# Patient Record
Sex: FEMALE | Race: WHITE | NOT HISPANIC OR LATINO | URBAN - METROPOLITAN AREA
[De-identification: names, ages, dates, MRNs, and addresses within clinical notes are randomized per-mention and may not be internally consistent; named-entity substitution may affect disease eponyms.]

---

## 2021-12-09 ENCOUNTER — TELEPHONE (OUTPATIENT)
Dept: OBSTETRICS AND GYNECOLOGY | Age: 31
End: 2021-12-09

## 2021-12-22 ENCOUNTER — INITIAL PRENATAL (OUTPATIENT)
Dept: OBSTETRICS AND GYNECOLOGY | Age: 31
End: 2021-12-22

## 2021-12-22 VITALS
HEIGHT: 65 IN | DIASTOLIC BLOOD PRESSURE: 76 MMHG | WEIGHT: 134.2 LBS | BODY MASS INDEX: 22.36 KG/M2 | SYSTOLIC BLOOD PRESSURE: 134 MMHG

## 2021-12-22 DIAGNOSIS — Z76.89 ENCOUNTER TO ESTABLISH CARE: ICD-10-CM

## 2021-12-22 DIAGNOSIS — Z14.8 CARRIER OF SPINAL MUSCULAR ATROPHY: ICD-10-CM

## 2021-12-22 DIAGNOSIS — Z13.89 SCREENING FOR HEMATURIA OR PROTEINURIA: ICD-10-CM

## 2021-12-22 DIAGNOSIS — O21.9 NAUSEA AND VOMITING DURING PREGNANCY: ICD-10-CM

## 2021-12-22 DIAGNOSIS — Z32.00 ENCOUNTER FOR CONFIRMATION OF PREGNANCY TEST RESULT WITH PHYSICAL EXAMINATION: Primary | ICD-10-CM

## 2021-12-22 DIAGNOSIS — Z3A.09 9 WEEKS GESTATION OF PREGNANCY: ICD-10-CM

## 2021-12-22 DIAGNOSIS — Z15.89 MTHFR MUTATION: ICD-10-CM

## 2021-12-22 LAB
CLARITY, POC: CLEAR
COLOR UR: YELLOW
GLUCOSE UR STRIP-MCNC: NEGATIVE MG/DL
PROT UR STRIP-MCNC: ABNORMAL MG/DL

## 2021-12-22 PROCEDURE — 0501F PRENATAL FLOW SHEET: CPT | Performed by: STUDENT IN AN ORGANIZED HEALTH CARE EDUCATION/TRAINING PROGRAM

## 2021-12-22 RX ORDER — PROMETHAZINE HYDROCHLORIDE 25 MG/1
25 TABLET ORAL EVERY 6 HOURS PRN
Qty: 30 TABLET | Refills: 2 | Status: SHIPPED | OUTPATIENT
Start: 2021-12-22 | End: 2022-06-29 | Stop reason: HOSPADM

## 2021-12-22 RX ORDER — FOLIC ACID 1 MG/1
4 TABLET ORAL DAILY
COMMUNITY
End: 2022-06-29 | Stop reason: HOSPADM

## 2021-12-22 NOTE — PROGRESS NOTES
[facility]   Obstetrics and Gynecology   New Obstetric Visit    2021    Patient: Wayne Mejia          MR#:[id number]    History of Present Illness    Chief Complaint   Patient presents with   • Gynecologic Exam     New OB - Preg conf HPT+ LMP 10/19 US today, c/o nausea   • Establish Care       31 y.o. female  at 9w1d presents for NOB visit.  She is doing well today.  Taking prenatal vitamins.  Still having daily nausea despite b6/unisom.    Had preconception counseling and genetic testing at [facility].  Copy in media tab.  Positive carrier of SMA, MTHFR, and CAH.  FOB has not been tested.  Denies h/o VTE and other family history of genetic disorders.    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 [employer]  Parents live in [place]    Past Medical History:   Diagnosis Date   • Carrier of spinal muscular atrophy     carrier positive, in media tab   • Lumbar herniated disc    • MTHFR gene mutation     carrier positive, in media tab     Past Surgical History:   Procedure Laterality Date   • WISDOM TOOTH EXTRACTION       Social History     Tobacco Use   Smoking Status Never Smoker   Smokeless Tobacco Never Used     Family History   Problem Relation Age of Onset   • No Known Problems Mother    • Hypertension Father    • Breast cancer Neg Hx    • Ovarian cancer Neg Hx    • Uterine cancer Neg Hx    • Colon cancer Neg Hx      Prior to Admission medications    Medication Sig Start Date End  "Date Taking? Authorizing Provider   folic acid (FOLVITE) 1 MG tablet Take 4 mg by mouth Daily.   Yes Provider, MD Armando   doxylamine (UNISOM) 25 MG tablet Take 1-2 tablets before bed as needed for nausea/vomiting 12/15/21   Tessa Purvis MD   promethazine (PHENERGAN) 25 MG tablet Take 1 tablet by mouth Every 6 (Six) Hours As Needed for Nausea or Vomiting for up to 30 doses. 12/22/21   Tessa Purvis MD     ________________________________________    The following portions of the patient's history were reviewed and updated as appropriate: allergies, current medications, past family history, past medical history, past social history, past surgical history and problem list.    Review of Systems   Gastrointestinal: Positive for nausea.   All other systems reviewed and are negative.           Objective     /76   Ht 165.1 cm (65\")   Wt 60.9 kg (134 lb 3.2 oz)   LMP 10/19/2021   BMI 22.33 kg/m²    BP Readings from Last 3 Encounters:   12/22/21 134/76      Wt Readings from Last 3 Encounters:   12/22/21 60.9 kg (134 lb 3.2 oz)        BMI: Estimated body mass index is 22.33 kg/m² as calculated from the following:    Height as of this encounter: 165.1 cm (65\").    Weight as of this encounter: 60.9 kg (134 lb 3.2 oz).    Physical Exam  Vitals and nursing note reviewed. Exam conducted with a chaperone present.   Constitutional:       General: She is not in acute distress.     Appearance: Normal appearance.   HENT:      Head: Normocephalic and atraumatic.   Eyes:      Extraocular Movements: Extraocular movements intact.   Cardiovascular:      Rate and Rhythm: Normal rate and regular rhythm.      Pulses: Normal pulses.      Heart sounds: No murmur heard.      Pulmonary:      Effort: Pulmonary effort is normal. No respiratory distress.      Breath sounds: Normal breath sounds.   Chest:   Breasts:      Right: Normal. No mass, nipple discharge, skin change, tenderness or axillary adenopathy.      Left: " Normal. No mass, nipple discharge, skin change, tenderness or axillary adenopathy.       Abdominal:      General: There is no distension.      Palpations: Abdomen is soft. There is no mass.      Tenderness: There is no abdominal tenderness.   Genitourinary:     General: Normal vulva.      Labia:         Right: No rash or lesion.         Left: No rash or lesion.       Urethra: No prolapse, urethral swelling or urethral lesion.      Vagina: Normal.      Cervix: Normal.      Uterus: Normal. Enlarged (8-9 wk uterus).       Adnexa: Right adnexa normal and left adnexa normal.      Comments: Bladder: no masses or tenderness  Perineum/Anus: no masses, lesions, or skin changes  Musculoskeletal:         General: No swelling. Normal range of motion.      Cervical back: Normal range of motion.   Lymphadenopathy:      Upper Body:      Right upper body: No axillary adenopathy.      Left upper body: No axillary adenopathy.   Skin:     General: Skin is warm and dry.   Neurological:      General: No focal deficit present.      Mental Status: She is alert and oriented to person, place, and time.   Psychiatric:         Mood and Affect: Mood normal.         Behavior: Behavior normal.           Assessment:  Diagnoses and all orders for this visit:    1. Encounter for confirmation of pregnancy test result with physical examination (Primary)  -     POC Urinalysis Dipstick  -     Urine Culture - Urine, Urine, Clean Catch    2. Screening for hematuria or proteinuria  -     POC Urinalysis Dipstick  -     Urine Culture - Urine, Urine, Clean Catch    3. Encounter to establish care    4. 9 weeks gestation of pregnancy  Overview:  -PNL: collect today  -Pap: NIL in 2021 per patient report  -Genetics: screening options reviewed, Bianca pamphlet provided, plan for anatomy Us at 18-22 wga  -Imm: RI (in media tab), jaida titer today, tdap > 27wga, s/p covid vaccine with booster, s/p flu shot  -Contraception: discuss at future visit  -Birthplan: discuss  at future visit      Orders:  -     ABO / Rh  -     Hepatitis B Surface Antigen  -     Cancel: Rubella Antibody, IgG  -     Antibody Screen  -     Varicella Zoster Antibody, IgG  -     CBC (No Diff)  -     HIV-1 / O / 2 Ag / Antibody 4th Generation  -     Hemoglobinopathy Fractionation Cascade  -     RPR, Rfx Qn RPR / Confirm TP  -     HCV Antibody Rfx To Qnt PCR  -     Chlamydia trachomatis, Neisseria gonorrhoeae, Trichomonas vaginalis, PCR - Swab, Vagina    5. MTHFR mutation  Overview:  -identified on preconception genetic testing  -denies h/o VTE  -no inc risk of VTE, preeclampsia, or other pregnancy complications  -taking folic acid 4mg qdaily      6. Nausea and vomiting during pregnancy  Overview:  -minimally improved with unisom/b6  -tolerating po but still having daily nausea  -rx phenergen sent to pharmacy, discussed that zofran is okay after 10wga      7. Carrier of spinal muscular atrophy  Overview:  -pos on preconception carrier screen  -need partner testing, contacted [facility] and they will send testing kit to office for FOB  -will need genetic counseling if FOB positive as well      Other orders  -     promethazine (PHENERGAN) 25 MG tablet; Take 1 tablet by mouth Every 6 (Six) Hours As Needed for Nausea or Vomiting for up to 30 doses.  Dispense: 30 tablet; Refill: 2        Plan:  Return in about 4 weeks (around 1/19/2022).   Nehemiah needs [employer] testing  SAB precautions reviewed    Tessa Purvis MD  12/22/2021 14:07 EST

## 2021-12-23 LAB
ABO GROUP BLD: NORMAL
BLD GP AB SCN SERPL QL: NEGATIVE
ERYTHROCYTE [DISTWIDTH] IN BLOOD BY AUTOMATED COUNT: 12.9 % (ref 11.7–15.4)
HBV SURFACE AG SERPL QL IA: NEGATIVE
HCT VFR BLD AUTO: 37.4 % (ref 34–46.6)
HCV AB S/CO SERPL IA: <0.1 S/CO RATIO (ref 0–0.9)
HCV AB SERPL QL IA: NORMAL
HGB A MFR BLD ELPH: 97.4 % (ref 96.4–98.8)
HGB A2 MFR BLD ELPH: 2.6 % (ref 1.8–3.2)
HGB BLD-MCNC: 12.6 G/DL (ref 11.1–15.9)
HGB F MFR BLD ELPH: 0 % (ref 0–2)
HGB FRACT BLD-IMP: NORMAL
HGB S MFR BLD ELPH: 0 %
HIV 1+2 AB+HIV1 P24 AG SERPL QL IA: NON REACTIVE
MCH RBC QN AUTO: 29.8 PG (ref 26.6–33)
MCHC RBC AUTO-ENTMCNC: 33.7 G/DL (ref 31.5–35.7)
MCV RBC AUTO: 88 FL (ref 79–97)
PLATELET # BLD AUTO: 248 X10E3/UL (ref 150–450)
RBC # BLD AUTO: 4.23 X10E6/UL (ref 3.77–5.28)
RH BLD: POSITIVE
RPR SER QL: NON REACTIVE
VZV IGG SER IA-ACNC: 822 INDEX
WBC # BLD AUTO: 8.2 X10E3/UL (ref 3.4–10.8)

## 2021-12-24 LAB
BACTERIA UR CULT: NO GROWTH
BACTERIA UR CULT: NORMAL
C TRACH RRNA SPEC QL NAA+PROBE: NEGATIVE
N GONORRHOEA RRNA SPEC QL NAA+PROBE: NEGATIVE
T VAGINALIS DNA SPEC QL NAA+PROBE: NEGATIVE

## 2021-12-24 NOTE — PROGRESS NOTES
Please call patient to let her know her OB panel was all normal.    Thanks!  Tessa Purvis MD  12/24/21 12:16 EST

## 2022-01-19 ENCOUNTER — ROUTINE PRENATAL (OUTPATIENT)
Dept: OBSTETRICS AND GYNECOLOGY | Age: 32
End: 2022-01-19

## 2022-01-19 VITALS — BODY MASS INDEX: 22.17 KG/M2 | DIASTOLIC BLOOD PRESSURE: 58 MMHG | WEIGHT: 133.2 LBS | SYSTOLIC BLOOD PRESSURE: 108 MMHG

## 2022-01-19 DIAGNOSIS — O21.9 NAUSEA AND VOMITING DURING PREGNANCY: ICD-10-CM

## 2022-01-19 DIAGNOSIS — Z15.89 MTHFR MUTATION: ICD-10-CM

## 2022-01-19 DIAGNOSIS — Z14.8 CARRIER OF SPINAL MUSCULAR ATROPHY: ICD-10-CM

## 2022-01-19 DIAGNOSIS — Z15.89 CYP1A2 GENE MUTATION: ICD-10-CM

## 2022-01-19 DIAGNOSIS — Z13.89 SCREENING FOR HEMATURIA OR PROTEINURIA: ICD-10-CM

## 2022-01-19 DIAGNOSIS — Z3A.13 13 WEEKS GESTATION OF PREGNANCY: Primary | ICD-10-CM

## 2022-01-19 LAB
CLARITY, POC: CLEAR
COLOR UR: YELLOW
GLUCOSE UR STRIP-MCNC: NEGATIVE MG/DL
PROT UR STRIP-MCNC: NEGATIVE MG/DL

## 2022-01-19 PROCEDURE — 0502F SUBSEQUENT PRENATAL CARE: CPT | Performed by: STUDENT IN AN ORGANIZED HEALTH CARE EDUCATION/TRAINING PROGRAM

## 2022-01-19 RX ORDER — PRENATAL VIT NO.126/IRON/FOLIC 28MG-0.8MG
1 TABLET ORAL DAILY
COMMUNITY
End: 2022-08-18

## 2022-01-19 NOTE — PROGRESS NOTES
Wayne Mejia presents at 13w1d for OB follow-up.  She is doing well today with no complaints.  Denies VB and pain.    Objective  BP Readings from Last 3 Encounters:   22 108/58   21 134/76      Wt Readings from Last 3 Encounters:   22 60.4 kg (133 lb 3.2 oz)   21 60.9 kg (134 lb 3.2 oz)        General: Awake, alert, no apparent distress  Respiratory: No increased work of breathing  Abdomen: Fundus soft and nontender  Extremity: Nontender, no edema    A/P  Diagnoses and all orders for this visit:    1. 13 weeks gestation of pregnancy (Primary)  Overview:  -PNL: normal  -Pap: NIL in  per patient report  -Genetics: plan for quadscreen at next visit, plan for anatomy Us at 18-22 wga  -Imm: RI (in media tab), VI, tdap > 27wga, s/p covid vaccine with booster, s/p flu shot  -Contraception: discuss at future visit  -Birthplan: discuss at future visit        2. Screening for hematuria or proteinuria  -     POC Urinalysis Dipstick    3. CY gene mutation  Overview:  -pos carrier on preconception genetic testing  -FOB testing with [employer] [facility] pending  -will refer for genetic counseling if FOB positive      4. MTHFR mutation  Overview:  -identified on preconception genetic testing  -denies h/o VTE  -no inc risk of VTE, preeclampsia, or other pregnancy complications  -taking folic acid 4mg qdaily      5. Nausea and vomiting during pregnancy  Overview:  -almost resolved, takes phenergen prn, nausea only at night, may start unisom qHS again      6. Carrier of spinal muscular atrophy  Overview:  -pos on preconception carrier screen  -FOB testing with [employer] pending  -will need genetic counseling if FOB positive as well          SAB precautions reviewed  Return in about 4 weeks (around 2022) for Next scheduled follow up.    Tessa Purvis MD

## 2022-02-03 ENCOUNTER — TELEPHONE (OUTPATIENT)
Dept: OBSTETRICS AND GYNECOLOGY | Age: 32
End: 2022-02-03

## 2022-02-03 NOTE — TELEPHONE ENCOUNTER
Pt notified [facility] Carrier Screen for partner Jeremy Mejia is negative.  Understanding verbalized by pt.  
No significant past surgical history

## 2022-02-17 ENCOUNTER — ROUTINE PRENATAL (OUTPATIENT)
Dept: OBSTETRICS AND GYNECOLOGY | Age: 32
End: 2022-02-17

## 2022-02-17 VITALS — WEIGHT: 135.6 LBS | DIASTOLIC BLOOD PRESSURE: 50 MMHG | SYSTOLIC BLOOD PRESSURE: 108 MMHG | BODY MASS INDEX: 22.57 KG/M2

## 2022-02-17 DIAGNOSIS — Z15.89 MTHFR MUTATION: ICD-10-CM

## 2022-02-17 DIAGNOSIS — Z13.89 SCREENING FOR HEMATURIA OR PROTEINURIA: ICD-10-CM

## 2022-02-17 DIAGNOSIS — Z14.8 CARRIER OF SPINAL MUSCULAR ATROPHY: ICD-10-CM

## 2022-02-17 DIAGNOSIS — Z3A.17 17 WEEKS GESTATION OF PREGNANCY: Primary | ICD-10-CM

## 2022-02-17 PROBLEM — O21.9 NAUSEA AND VOMITING DURING PREGNANCY: Status: RESOLVED | Noted: 2021-12-22 | Resolved: 2022-02-17

## 2022-02-17 PROCEDURE — 0502F SUBSEQUENT PRENATAL CARE: CPT | Performed by: STUDENT IN AN ORGANIZED HEALTH CARE EDUCATION/TRAINING PROGRAM

## 2022-02-17 NOTE — PROGRESS NOTES
Wayne Mejia presents at 17w2d for OB follow-up.  She is doing well today with no complaints.  Denies VB and pain.  Has felt a few flutters but no consistent mvmt.    Objective  BP Readings from Last 3 Encounters:   02/17/22 108/50   01/19/22 108/58   12/22/21 134/76      Wt Readings from Last 3 Encounters:   02/17/22 61.5 kg (135 lb 9.6 oz)   01/19/22 60.4 kg (133 lb 3.2 oz)   12/22/21 60.9 kg (134 lb 3.2 oz)      Total weight gain this pregnancy: 0.272 kg (9.6 oz)    General: Awake, alert, no apparent distress  Respiratory: No increased work of breathing  Abdomen: Fundus soft and nontender  Extremity: Nontender, no edema    A/P  Diagnoses and all orders for this visit:    1. 17 weeks gestation of pregnancy (Primary)  Overview:  -PNL: normal  -Pap: NIL in 2021 per patient report  -Genetics: quadscreen today, anatomy Us at next visit  -Imm: RI (in media tab), VI, tdap > 27wga, s/p covid vaccine with booster, s/p flu shot  -Contraception: discuss at future visit  -Birthplan: discuss at future visit      Orders:  -     Maternal Screen 4    2. Screening for hematuria or proteinuria  -     POC Urinalysis Dipstick    3. MTHFR mutation  Overview:  -identified on preconception genetic testing  -denies h/o VTE  -no inc risk of VTE, preeclampsia, or other pregnancy complications  -taking folic acid 4mg qdaily      4. Carrier of spinal muscular atrophy  Overview:  -pos on preconception carrier screen  -FOB testing neg        SAB precautions reviewed  Return in about 4 weeks (around 3/17/2022) for Next f/u with anatomy Us.    Tessa Purvis MD

## 2022-02-19 LAB
2ND TRIMESTER 4 SCREEN SERPL-IMP: NORMAL
AFP ADJ MOM SERPL: 0.64
AFP SERPL-MCNC: 27.5 NG/ML
AGE AT DELIVERY: 29.8 YR
FET TS 18 RISK FROM MAT AGE: NORMAL
FET TS 21 RISK FROM MAT AGE: 716
GA METHOD: NORMAL
GA: 17.3 WEEKS
HCG ADJ MOM SERPL: 0.98
HCG SERPL-ACNC: NORMAL MIU/ML
IDDM PATIENT QL: NO
INHIBIN A ADJ MOM SERPL: 0.92
INHIBIN A SERPL-MCNC: 154.33 PG/ML
MULTIPLE PREGNANCY: NO
NEURAL TUBE DEFECT RISK FETUS: NORMAL %
SERVICE CMNT-IMP: NORMAL
TS 18 RISK FETUS: NORMAL
TS 21 RISK FETUS: 2317
U ESTRIOL ADJ MOM SERPL: 0.88
U ESTRIOL SERPL-MCNC: 1.15 NG/ML

## 2022-02-21 NOTE — PROGRESS NOTES
Please call patient to let her know that her screen for neural tube defects was negative.Thank you,  Tessa Purvis MD  02/21/22  07:59 EST

## 2022-03-22 ENCOUNTER — ROUTINE PRENATAL (OUTPATIENT)
Dept: OBSTETRICS AND GYNECOLOGY | Age: 32
End: 2022-03-22

## 2022-03-22 VITALS — SYSTOLIC BLOOD PRESSURE: 112 MMHG | DIASTOLIC BLOOD PRESSURE: 68 MMHG | WEIGHT: 137.4 LBS | BODY MASS INDEX: 22.86 KG/M2

## 2022-03-22 DIAGNOSIS — Z15.89 CYP1A2 GENE MUTATION: ICD-10-CM

## 2022-03-22 DIAGNOSIS — Z13.89 SCREENING FOR HEMATURIA OR PROTEINURIA: ICD-10-CM

## 2022-03-22 DIAGNOSIS — Z3A.22 22 WEEKS GESTATION OF PREGNANCY: Primary | ICD-10-CM

## 2022-03-22 DIAGNOSIS — Z14.8 CARRIER OF SPINAL MUSCULAR ATROPHY: ICD-10-CM

## 2022-03-22 DIAGNOSIS — Z15.89 MTHFR MUTATION: ICD-10-CM

## 2022-03-22 PROCEDURE — 0502F SUBSEQUENT PRENATAL CARE: CPT | Performed by: STUDENT IN AN ORGANIZED HEALTH CARE EDUCATION/TRAINING PROGRAM

## 2022-03-22 NOTE — PROGRESS NOTES
Wayne Mejia, a 31 y.o.  at 22w0d, presents for OB follow-up.  She is doing well today with no complaints.  Denies loss of fluid, vaginal bleeding, and contractions.  Endorses fetal movements.  Anatomy Us today normal.    Objective  BP Readings from Last 3 Encounters:   22 112/68   22 108/50   22 108/58      Wt Readings from Last 3 Encounters:   22 62.3 kg (137 lb 6.4 oz)   22 61.5 kg (135 lb 9.6 oz)   22 60.4 kg (133 lb 3.2 oz)      Total weight gain this pregnancy: 1.089 kg (2 lb 6.4 oz)    General: Awake, alert, no apparent distress  Respiratory: No increased work of breathing  Abdomen: Fundus soft and nontender  Extremity: Nontender, no edema    A/P  Diagnoses and all orders for this visit:    1. 22 weeks gestation of pregnancy (Primary)  Overview:  -PNL: normal  -Pap: NIL in  per patient report  -Genetics: quadscreen normal, anatomy Us normal  -Imm: RI (in media tab), VI, tdap > 27wga, s/p covid vaccine with booster, s/p flu shot  -Contraception: discuss at future visit  -Birthplan: discuss at future visit        2. Screening for hematuria or proteinuria  -     POC Urinalysis Dipstick    3. CY gene mutation  Overview:  -pos carrier on preconception genetic testing      4. MTHFR mutation  Overview:  -identified on preconception genetic testing  -denies h/o VTE  -no inc risk of VTE, preeclampsia, or other pregnancy complications  -taking folic acid 4mg qdaily      5. Carrier of spinal muscular atrophy  Overview:  -pos on preconception carrier screen  -FOB testing neg          Labor precautions reviewed  Return in about 4 weeks (around 2022) for F/u with 1hr GTT/CBC.    Tessa Purvis MD

## 2022-03-28 ENCOUNTER — PATIENT MESSAGE (OUTPATIENT)
Dept: OBSTETRICS AND GYNECOLOGY | Age: 32
End: 2022-03-28

## 2022-03-28 NOTE — TELEPHONE ENCOUNTER
From: Wayne Mejia  To: Tessa Purvis MD  Sent: 3/28/2022 11:28 AM EDT  Subject: Random question    Hi! This is random but I thought it couldn’t hurt to ask…I took my dog for a well visit and turns out he has giardia which we’re going to treat. I primarily clean up after him and obviously wash my hands and such, but is there anything I should be worried about if I’m symptom free?

## 2022-04-20 ENCOUNTER — ROUTINE PRENATAL (OUTPATIENT)
Dept: OBSTETRICS AND GYNECOLOGY | Age: 32
End: 2022-04-20

## 2022-04-20 VITALS — SYSTOLIC BLOOD PRESSURE: 104 MMHG | DIASTOLIC BLOOD PRESSURE: 54 MMHG | WEIGHT: 142 LBS | BODY MASS INDEX: 23.63 KG/M2

## 2022-04-20 DIAGNOSIS — Z15.89 MTHFR MUTATION: ICD-10-CM

## 2022-04-20 DIAGNOSIS — Z14.8 CARRIER OF SPINAL MUSCULAR ATROPHY: ICD-10-CM

## 2022-04-20 DIAGNOSIS — Z15.89 CYP1A2 GENE MUTATION: ICD-10-CM

## 2022-04-20 DIAGNOSIS — Z3A.26 26 WEEKS GESTATION OF PREGNANCY: Primary | ICD-10-CM

## 2022-04-20 DIAGNOSIS — Z13.89 SCREENING FOR HEMATURIA OR PROTEINURIA: ICD-10-CM

## 2022-04-20 DIAGNOSIS — Z13.0 SCREENING FOR IRON DEFICIENCY ANEMIA: ICD-10-CM

## 2022-04-20 DIAGNOSIS — Z13.1 SCREENING FOR DIABETES MELLITUS (DM): ICD-10-CM

## 2022-04-20 PROCEDURE — 0502F SUBSEQUENT PRENATAL CARE: CPT | Performed by: STUDENT IN AN ORGANIZED HEALTH CARE EDUCATION/TRAINING PROGRAM

## 2022-04-20 NOTE — PROGRESS NOTES
Wayne Mejia, a 31 y.o.  at 26w1d, presents for OB follow-up.  She is doing well today.  Denies loss of fluid, vaginal bleeding, and contractions.  Endorses fetal movements.    Planning flight in 2 wks.  Planning road trip to [place] around .    Objective  BP Readings from Last 3 Encounters:   22 104/54   22 112/68   22 108/50      Wt Readings from Last 3 Encounters:   22 64.4 kg (142 lb)   22 62.3 kg (137 lb 6.4 oz)   22 61.5 kg (135 lb 9.6 oz)      Total weight gain this pregnancy: 3.175 kg (7 lb)    General: Awake, alert, no apparent distress  Respiratory: No increased work of breathing  Abdomen: Fundus soft and nontender  Extremity: Nontender, no edema    A/P  Diagnoses and all orders for this visit:    1. 26 weeks gestation of pregnancy (Primary)  Overview:  -PNL: normal, 1hr and CBC today  -Pap: NIL in  per patient report  -Genetics: quadscreen normal, anatomy Us normal  -Imm: RI (in media tab), VI, tdap > 27wga, s/p covid vaccine with booster, s/p flu shot  -Contraception: discuss at future visit  -Birthplan: discuss at future visit        2. Screening for hematuria or proteinuria  -     POC Urinalysis Dipstick    3. CY gene mutation  Overview:  -pos carrier on preconception genetic testing      4. MTHFR mutation  Overview:  -identified on preconception genetic testing  -denies h/o VTE  -no inc risk of VTE, preeclampsia, or other pregnancy complications  -taking folic acid 4mg qdaily      5. Carrier of spinal muscular atrophy  Overview:  -pos on preconception carrier screen  -FOB testing neg      6. Screening for diabetes mellitus (DM)  -     Gestational Screen 1 Hr ([facility])    7. Screening for iron deficiency anemia  -     CBC (No Diff)        Labor precautions reviewed  Return in about 4 weeks (around 2022) for Next f/u.    Tessa Shannon Purvis MD

## 2022-04-21 LAB
ERYTHROCYTE [DISTWIDTH] IN BLOOD BY AUTOMATED COUNT: 12.1 % (ref 11.7–15.4)
GLUCOSE 1H P 50 G GLC PO SERPL-MCNC: 81 MG/DL (ref 65–139)
HCT VFR BLD AUTO: 32.7 % (ref 34–46.6)
HGB BLD-MCNC: 11.1 G/DL (ref 11.1–15.9)
MCH RBC QN AUTO: 30.4 PG (ref 26.6–33)
MCHC RBC AUTO-ENTMCNC: 33.9 G/DL (ref 31.5–35.7)
MCV RBC AUTO: 90 FL (ref 79–97)
PLATELET # BLD AUTO: 223 X10E3/UL (ref 150–450)
RBC # BLD AUTO: 3.65 X10E6/UL (ref 3.77–5.28)
WBC # BLD AUTO: 9.5 X10E3/UL (ref 3.4–10.8)

## 2022-04-21 NOTE — PROGRESS NOTES
Please call patient to let her know her gestational diabetes screen was normal.  Blood counts look great too.

## 2022-05-18 ENCOUNTER — ROUTINE PRENATAL (OUTPATIENT)
Dept: OBSTETRICS AND GYNECOLOGY | Age: 32
End: 2022-05-18

## 2022-05-18 VITALS — DIASTOLIC BLOOD PRESSURE: 58 MMHG | SYSTOLIC BLOOD PRESSURE: 112 MMHG | WEIGHT: 147.6 LBS | BODY MASS INDEX: 24.56 KG/M2

## 2022-05-18 DIAGNOSIS — Z15.89 MTHFR MUTATION: ICD-10-CM

## 2022-05-18 DIAGNOSIS — Z13.89 SCREENING FOR HEMATURIA OR PROTEINURIA: ICD-10-CM

## 2022-05-18 DIAGNOSIS — Z14.8 CARRIER OF SPINAL MUSCULAR ATROPHY: ICD-10-CM

## 2022-05-18 DIAGNOSIS — Z15.89 CYP1A2 GENE MUTATION: ICD-10-CM

## 2022-05-18 DIAGNOSIS — Z3A.30 30 WEEKS GESTATION OF PREGNANCY: Primary | ICD-10-CM

## 2022-05-18 DIAGNOSIS — O26.849 UTERINE SIZE DATE DISCREPANCY PREGNANCY: ICD-10-CM

## 2022-05-18 PROCEDURE — 0502F SUBSEQUENT PRENATAL CARE: CPT | Performed by: STUDENT IN AN ORGANIZED HEALTH CARE EDUCATION/TRAINING PROGRAM

## 2022-05-18 PROCEDURE — 90471 IMMUNIZATION ADMIN: CPT | Performed by: STUDENT IN AN ORGANIZED HEALTH CARE EDUCATION/TRAINING PROGRAM

## 2022-05-18 PROCEDURE — 90715 TDAP VACCINE 7 YRS/> IM: CPT | Performed by: STUDENT IN AN ORGANIZED HEALTH CARE EDUCATION/TRAINING PROGRAM

## 2022-05-18 NOTE — PROGRESS NOTES
Wayne Mejia, a 31 y.o.  at 30w1d, presents for OB follow-up.  She is doing well today.  Denies loss of fluid, vaginal bleeding, and contractions.  Endorses fetal movements.    Objective  BP Readings from Last 3 Encounters:   22 112/58   22 104/54   22 112/68      Wt Readings from Last 3 Encounters:   22 67 kg (147 lb 9.6 oz)   22 64.4 kg (142 lb)   22 62.3 kg (137 lb 6.4 oz)      Total weight gain this pregnancy: 5.715 kg (12 lb 9.6 oz)    General: Awake, alert, no apparent distress  Respiratory: No increased work of breathing  Abdomen: Fundus soft and nontender  Extremity: Nontender, no edema    A/P  Diagnoses and all orders for this visit:    1. 30 weeks gestation of pregnancy (Primary)  Overview:  -PNL: normal, 1hr nromal, plan for GBS/CBC at 36 wga  -Pap: NIL in  per patient report  -Genetics: quadscreen normal, anatomy Us normal  -Imm: RI (in media tab), VI, tdap today, s/p covid vaccine with booster, s/p flu shot  -Contraception: thinking OCPs (junel 1/20 in past)  -Birthplan: girl, likely epidural, breast, taking birth class this Saturday so will bring questions to next visit      2. Screening for hematuria or proteinuria  -     POC Urinalysis Dipstick    3. CY gene mutation  Overview:  -pos carrier on preconception genetic testing      4. MTHFR mutation  Overview:  -identified on preconception genetic testing  -denies h/o VTE  -no inc risk of VTE, preeclampsia, or other pregnancy complications  -taking folic acid 4mg qdaily      5. Carrier of spinal muscular atrophy  Overview:  -pos on preconception carrier screen  -FOB testing neg      6. Uterine size date discrepancy pregnancy  Overview:  -S<D  -growth Us at next visit      Other orders  -     Tdap Vaccine Greater Than or Equal To 8yo IM        Labor precautions reviewed  Return in about 2 weeks (around 2022) for F/u q2 wks x 3 visits (first appt , schedule being opened), growth Us with next  visit.    Tessa Purvis MD

## 2022-05-31 ENCOUNTER — ROUTINE PRENATAL (OUTPATIENT)
Dept: OBSTETRICS AND GYNECOLOGY | Age: 32
End: 2022-05-31

## 2022-05-31 VITALS — BODY MASS INDEX: 24.3 KG/M2 | DIASTOLIC BLOOD PRESSURE: 70 MMHG | WEIGHT: 146 LBS | SYSTOLIC BLOOD PRESSURE: 110 MMHG

## 2022-05-31 DIAGNOSIS — O26.849 UTERINE SIZE DATE DISCREPANCY PREGNANCY: ICD-10-CM

## 2022-05-31 DIAGNOSIS — Z15.89 MTHFR MUTATION: ICD-10-CM

## 2022-05-31 DIAGNOSIS — Z14.8 CARRIER OF SPINAL MUSCULAR ATROPHY: ICD-10-CM

## 2022-05-31 DIAGNOSIS — Z3A.31 31 WEEKS GESTATION OF PREGNANCY: Primary | ICD-10-CM

## 2022-05-31 DIAGNOSIS — Z3A.32 32 WEEKS GESTATION OF PREGNANCY: ICD-10-CM

## 2022-05-31 DIAGNOSIS — Z15.89 CYP1A2 GENE MUTATION: ICD-10-CM

## 2022-05-31 PROCEDURE — 0502F SUBSEQUENT PRENATAL CARE: CPT | Performed by: STUDENT IN AN ORGANIZED HEALTH CARE EDUCATION/TRAINING PROGRAM

## 2022-05-31 NOTE — PROGRESS NOTES
Wayne Mejia, a 31 y.o.  at 32w0d, presents for OB follow-up.  She is doing well today.  Denies loss of fluid, vaginal bleeding, and contractions.  Endorses fetal movements.  Feeling more tired at end of day.    Objective  BP Readings from Last 3 Encounters:   22 110/70   22 112/58   22 104/54      Wt Readings from Last 3 Encounters:   22 66.2 kg (146 lb)   22 67 kg (147 lb 9.6 oz)   22 64.4 kg (142 lb)      Total weight gain this pregnancy: 4.99 kg (11 lb)    General: Awake, alert, no apparent distress  Respiratory: No increased work of breathing  Abdomen: Fundus soft and nontender  Extremity: Nontender, no edema    A/P  Diagnoses and all orders for this visit:    1. 31 weeks gestation of pregnancy (Primary)  -     POC Urinalysis Dipstick    2. MTHFR mutation  Overview:  -identified on preconception genetic testing  -denies h/o VTE  -no inc risk of VTE, preeclampsia, or other pregnancy complications  -taking folic acid 4mg qdaily      3. 32 weeks gestation of pregnancy  Overview:  -PNL: normal, 1hr nromal, plan for GBS/CBC at 36 wga  -Pap: NIL in  per patient report  -Genetics: quadscreen normal, anatomy Us normal  -Imm: RI (in media tab), VI, s/p tdap, s/p covid vaccine with booster, s/p flu shot  -Contraception: thinking OCPs (junel 1/20 in past)  -Birthplan: girl, likely epidural, breast, s/p birth class (overall helpful)  -Care coordination: accepts blood products, call schedule reviewed      4. Uterine size date discrepancy pregnancy  Overview:  -S<D  -growth Us at next visit      5. Carrier of spinal muscular atrophy  Overview:  -pos on preconception carrier screen  -FOB testing neg      6. CY gene mutation  Overview:  -pos carrier on preconception genetic testing        Labor precautions reviewed  RTC 2 wks    Tessa Purvis MD

## 2022-06-07 ENCOUNTER — LAB (OUTPATIENT)
Dept: OBSTETRICS AND GYNECOLOGY | Age: 32
End: 2022-06-07

## 2022-06-07 DIAGNOSIS — O99.713 PRURIGO OF PREGNANCY IN THIRD TRIMESTER: Primary | ICD-10-CM

## 2022-06-07 DIAGNOSIS — L28.2 PRURIGO OF PREGNANCY IN THIRD TRIMESTER: Primary | ICD-10-CM

## 2022-06-07 DIAGNOSIS — L28.2 PRURIGO OF PREGNANCY IN THIRD TRIMESTER: ICD-10-CM

## 2022-06-07 DIAGNOSIS — O99.713 PRURIGO OF PREGNANCY IN THIRD TRIMESTER: ICD-10-CM

## 2022-06-08 ENCOUNTER — DOCUMENTATION (OUTPATIENT)
Dept: OBSTETRICS AND GYNECOLOGY | Age: 32
End: 2022-06-08

## 2022-06-08 LAB
ALP SERPL-CCNC: 207 IU/L (ref 44–121)
ALT SERPL-CCNC: 145 IU/L (ref 0–32)
AST SERPL-CCNC: 80 IU/L (ref 0–40)
BASOPHILS # BLD AUTO: 0.1 X10E3/UL (ref 0–0.2)
BASOPHILS NFR BLD AUTO: 1 %
BILE AC SERPL-SCNC: 36.7 UMOL/L (ref 0–10)
BILIRUB SERPL-MCNC: 0.7 MG/DL (ref 0–1.2)
CREAT SERPL-MCNC: 0.58 MG/DL (ref 0.57–1)
EGFRCR SERPLBLD CKD-EPI 2021: 126 ML/MIN/1.73
EOSINOPHIL # BLD AUTO: 0 X10E3/UL (ref 0–0.4)
EOSINOPHIL NFR BLD AUTO: 1 %
ERYTHROCYTE [DISTWIDTH] IN BLOOD BY AUTOMATED COUNT: 12.6 % (ref 11.7–15.4)
HCT VFR BLD AUTO: 36.3 % (ref 34–46.6)
HGB BLD-MCNC: 12.1 G/DL (ref 11.1–15.9)
IMM GRANULOCYTES # BLD AUTO: 0.2 X10E3/UL (ref 0–0.1)
IMM GRANULOCYTES NFR BLD AUTO: 2 %
LDH SERPL-CCNC: 143 IU/L (ref 119–226)
LYMPHOCYTES # BLD AUTO: 1.8 X10E3/UL (ref 0.7–3.1)
LYMPHOCYTES NFR BLD AUTO: 23 %
MCH RBC QN AUTO: 28.9 PG (ref 26.6–33)
MCHC RBC AUTO-ENTMCNC: 33.3 G/DL (ref 31.5–35.7)
MCV RBC AUTO: 87 FL (ref 79–97)
MONOCYTES # BLD AUTO: 0.7 X10E3/UL (ref 0.1–0.9)
MONOCYTES NFR BLD AUTO: 9 %
NEUTROPHILS # BLD AUTO: 5 X10E3/UL (ref 1.4–7)
NEUTROPHILS NFR BLD AUTO: 64 %
PLATELET # BLD AUTO: 251 X10E3/UL (ref 150–450)
RBC # BLD AUTO: 4.19 X10E6/UL (ref 3.77–5.28)
URATE SERPL-MCNC: 3.8 MG/DL (ref 2.6–6.2)
WBC # BLD AUTO: 7.7 X10E3/UL (ref 3.4–10.8)

## 2022-06-08 NOTE — PROGRESS NOTES
I spoke with patient regarding itching of palms and soles.  She reports that it became noticeable over the weekend in [place] but allergies were also high.  It has since improved and is barely noticeable.  She has not taken an anti-histamine yet.  Itching isn't bothersome enough for tx at this point.   Bile acids and CMP are pending.  Please let me know when these result.    We discussed the possibility of cholestasis and that this does come with increased risk of IUFD as well as early delivery.  However, I have low suspicion for this diagnosis based on the near resolution of symptoms since she has returned to [place].  Will hold off on ursodiol for now.  Discussed anti-histamines prn.

## 2022-06-10 ENCOUNTER — TELEPHONE (OUTPATIENT)
Dept: OBSTETRICS AND GYNECOLOGY | Age: 32
End: 2022-06-10

## 2022-06-10 RX ORDER — URSODIOL 300 MG/1
300 CAPSULE ORAL 3 TIMES DAILY
Qty: 90 CAPSULE | Refills: 1 | Status: SHIPPED | OUTPATIENT
Start: 2022-06-10 | End: 2022-06-12

## 2022-06-10 NOTE — TELEPHONE ENCOUNTER
I spoke with patient regarding her recent labs.  Elevated bile acids and LFTs so discussed diagnosis of intrahepatic cholestasis of pregnancy.  We discussed increased risk of IUFD despite  surveillance.  We will follow [facility] recommendations for management.  Plan for twice weekly BPP and delivery in 36th wk.  Discussed daily FKC.    -Plan for BPP Mon/Thurs until delivery, starting Mon,  - will have office call patient to schedule  -Plan for BMZ administration  and  in office  -Plan for IOL 7/3 at 36w5d  -Rx ursodiol 300mg TID sent for symptomatic relief.

## 2022-06-12 RX ORDER — URSODIOL 250 MG/1
250 TABLET, FILM COATED ORAL 3 TIMES DAILY
Qty: 30 TABLET | Refills: 1 | Status: SHIPPED | OUTPATIENT
Start: 2022-06-12 | End: 2022-06-29 | Stop reason: HOSPADM

## 2022-06-13 ENCOUNTER — ROUTINE PRENATAL (OUTPATIENT)
Dept: OBSTETRICS AND GYNECOLOGY | Age: 32
End: 2022-06-13

## 2022-06-13 VITALS — SYSTOLIC BLOOD PRESSURE: 116 MMHG | BODY MASS INDEX: 24.13 KG/M2 | DIASTOLIC BLOOD PRESSURE: 70 MMHG | WEIGHT: 145 LBS

## 2022-06-13 DIAGNOSIS — O26.849 UTERINE SIZE DATE DISCREPANCY PREGNANCY: ICD-10-CM

## 2022-06-13 DIAGNOSIS — Z15.89 CYP1A2 GENE MUTATION: ICD-10-CM

## 2022-06-13 DIAGNOSIS — O26.613 CHOLESTASIS OF PREGNANCY IN THIRD TRIMESTER: ICD-10-CM

## 2022-06-13 DIAGNOSIS — K83.1 CHOLESTASIS OF PREGNANCY IN THIRD TRIMESTER: ICD-10-CM

## 2022-06-13 DIAGNOSIS — Z14.8 CARRIER OF SPINAL MUSCULAR ATROPHY: ICD-10-CM

## 2022-06-13 DIAGNOSIS — Z15.89 MTHFR MUTATION: ICD-10-CM

## 2022-06-13 DIAGNOSIS — Z13.89 SCREENING FOR BLOOD OR PROTEIN IN URINE: Primary | ICD-10-CM

## 2022-06-13 PROBLEM — O26.643 CHOLESTASIS OF PREGNANCY IN THIRD TRIMESTER: Status: ACTIVE | Noted: 2022-06-13

## 2022-06-13 LAB
GLUCOSE UR STRIP-MCNC: NEGATIVE MG/DL
PROT UR STRIP-MCNC: NEGATIVE MG/DL

## 2022-06-13 PROCEDURE — 0502F SUBSEQUENT PRENATAL CARE: CPT | Performed by: NURSE PRACTITIONER

## 2022-06-13 NOTE — PROGRESS NOTES
Chief Complaint   Patient presents with   • Routine Prenatal Visit       HPI: 31 y.o.  at 33w6d here for BPP.    Newly diagnosed cholestasis. Had itching on hands and feet last week.  Bile acids were elevated at 36.7.  She just started on actigall last night and has taken 3 doses.  States itching has not worsened and may be slightly better.  She denies any contractions or cramping.  Baby is active and always passes FMCs at home.   is present and they have quite a few questions.  Reviewed plan of twice weekly BPPs in office. BMZ planned and delivery at 36.5.  He requests an NST today.        Vitals:    22 1415   BP: 116/70   Weight: 65.8 kg (145 lb)     Total weight gain for pregnancy:  4.536 kg (10 lb)    Review of systems:   Gen: negative  CV:     negative  GI: negative  :   good fetal movement noted   MS:    negative  Neuro: negative and denies headaches and visual changes   Pul: negative    A/P  1. Intrauterine pregnancy at 33w6d   2. Pregnancy Risk:  HIGH RISK        Diagnoses and all orders for this visit:    1. Screening for blood or protein in urine (Primary)  -     POC Urinalysis Dipstick    2. CY gene mutation    3. MTHFR mutation    4. Cholestasis of pregnancy in third trimester    5. Carrier of spinal muscular atrophy    6. Uterine size date discrepancy pregnancy    NST done today x 40 mins.   FHT 140s with moderate variability and accels, no decels.  Occasional contractions noted, patient does not feel them.       labor was discussed.  Warnings were provided.  Routine labor warnings were discussed and indications for L & D f/u including bleeding, regular contractions, decreased fetal movement or/and rupture of membranes.   Discussed FMCs.  She will call with any increase in itching or concerns with FM.  -----------------------  PLAN:   Thursday with Dr Purvis for BPP      Sonja Bell, APRN  2022 15:50 EDT

## 2022-06-16 ENCOUNTER — ROUTINE PRENATAL (OUTPATIENT)
Dept: OBSTETRICS AND GYNECOLOGY | Age: 32
End: 2022-06-16

## 2022-06-16 VITALS — WEIGHT: 146.4 LBS | DIASTOLIC BLOOD PRESSURE: 60 MMHG | SYSTOLIC BLOOD PRESSURE: 116 MMHG | BODY MASS INDEX: 24.36 KG/M2

## 2022-06-16 DIAGNOSIS — O26.613 CHOLESTASIS OF PREGNANCY IN THIRD TRIMESTER: ICD-10-CM

## 2022-06-16 DIAGNOSIS — O26.849 UTERINE SIZE DATE DISCREPANCY PREGNANCY: ICD-10-CM

## 2022-06-16 DIAGNOSIS — K83.1 CHOLESTASIS OF PREGNANCY IN THIRD TRIMESTER: ICD-10-CM

## 2022-06-16 DIAGNOSIS — Z13.89 SCREENING FOR BLOOD OR PROTEIN IN URINE: ICD-10-CM

## 2022-06-16 DIAGNOSIS — K83.1 CHOLESTASIS DURING PREGNANCY IN THIRD TRIMESTER: ICD-10-CM

## 2022-06-16 DIAGNOSIS — O26.613 CHOLESTASIS DURING PREGNANCY IN THIRD TRIMESTER: ICD-10-CM

## 2022-06-16 DIAGNOSIS — Z15.89 CYP1A2 GENE MUTATION: ICD-10-CM

## 2022-06-16 DIAGNOSIS — Z3A.34 34 WEEKS GESTATION OF PREGNANCY: Primary | ICD-10-CM

## 2022-06-16 DIAGNOSIS — Z15.89 MTHFR MUTATION: ICD-10-CM

## 2022-06-16 DIAGNOSIS — Z14.8 CARRIER OF SPINAL MUSCULAR ATROPHY: ICD-10-CM

## 2022-06-16 PROCEDURE — 0502F SUBSEQUENT PRENATAL CARE: CPT | Performed by: STUDENT IN AN ORGANIZED HEALTH CARE EDUCATION/TRAINING PROGRAM

## 2022-06-16 NOTE — PROGRESS NOTES
Wayne Mejia, a 31 y.o.  at 34w2d, presents for OB follow-up.  She is doing well today.  Denies loss of fluid, vaginal bleeding, and contractions.  Endorses fetal movements.  Nehemiah is with her today to discuss cholestasis.    Objective  BP Readings from Last 3 Encounters:   22 116/60   22 116/70   22 110/70      Wt Readings from Last 3 Encounters:   22 66.4 kg (146 lb 6.4 oz)   22 65.8 kg (145 lb)   22 66.2 kg (146 lb)      Total weight gain this pregnancy: 5.171 kg (11 lb 6.4 oz)    General: Awake, alert, no apparent distress  Respiratory: No increased work of breathing  Abdomen: Fundus soft and nontender  Extremity: Nontender, no edema    A/P  Diagnoses and all orders for this visit:    1. 34 weeks gestation of pregnancy (Primary)  Overview:  -PNL: 1hr normal, GBS/CBC today in anticipation of early delivery  -Pap: NIL in  per patient report  -Genetics: quadscreen normal, anatomy Us normal  -Imm: RI (in media tab), VI, s/p tdap, s/p covid vaccine with booster, s/p flu shot  -Contraception: thinking OCPs (junel 1/20 in past)  -Birthplan: girl, likely epidural, breast, s/p birth class (overall helpful)  -Care coordination: accepts blood products, call schedule reviewed    Orders:  -     Bile Acids, Total  -     Comprehensive Metabolic Panel  -     CBC (No Diff)  -     Group B Streptococcus Culture - Swab, Vaginal/Rectum    2. Screening for blood or protein in urine  -     POC Urinalysis Dipstick    3. Cholestasis during pregnancy in third trimester  -     Bile Acids, Total  -     Comprehensive Metabolic Panel    4. CY gene mutation  Overview:  -pos carrier on preconception genetic testing      5. MTHFR mutation  Overview:  -identified on preconception genetic testing  -denies h/o VTE  -no inc risk of VTE, preeclampsia, or other pregnancy complications  -taking folic acid 4mg qdaily      6. Cholestasis of pregnancy in third trimester  Overview:  -total bile acids 37,  AST/ALT 80/145 > repeat labs today and weekly  -continue twice weekly BPP, thus far reassuring  -plan for first dose of BMZ at next visit and second dose 24 hours later      7. Uterine size date discrepancy pregnancy  Overview:  -S<D  -EFW 29% at 32wga, no need for further growth Us      8. Carrier of spinal muscular atrophy  Overview:  -pos on preconception carrier screen  -FOB testing neg        Labor precautions reviewed  Return in about 5 days (around 6/21/2022) for Next f/u with BPP.    Tessa Purvis MD

## 2022-06-17 LAB
ALBUMIN SERPL-MCNC: 3.7 G/DL (ref 3.9–5)
ALBUMIN/GLOB SERPL: 1.4 {RATIO} (ref 1.2–2.2)
ALP SERPL-CCNC: 226 IU/L (ref 44–121)
ALT SERPL-CCNC: 309 IU/L (ref 0–32)
AST SERPL-CCNC: 112 IU/L (ref 0–40)
BILE AC SERPL-SCNC: 134.8 UMOL/L (ref 0–10)
BILIRUB SERPL-MCNC: 0.7 MG/DL (ref 0–1.2)
BUN SERPL-MCNC: 8 MG/DL (ref 6–20)
BUN/CREAT SERPL: 13 (ref 9–23)
CALCIUM SERPL-MCNC: 9 MG/DL (ref 8.7–10.2)
CHLORIDE SERPL-SCNC: 99 MMOL/L (ref 96–106)
CO2 SERPL-SCNC: 22 MMOL/L (ref 20–29)
CREAT SERPL-MCNC: 0.63 MG/DL (ref 0.57–1)
EGFRCR SERPLBLD CKD-EPI 2021: 123 ML/MIN/1.73
ERYTHROCYTE [DISTWIDTH] IN BLOOD BY AUTOMATED COUNT: 12.6 % (ref 11.7–15.4)
GLOBULIN SER CALC-MCNC: 2.7 G/DL (ref 1.5–4.5)
GLUCOSE SERPL-MCNC: 75 MG/DL (ref 65–99)
HCT VFR BLD AUTO: 34.7 % (ref 34–46.6)
HGB BLD-MCNC: 11.6 G/DL (ref 11.1–15.9)
MCH RBC QN AUTO: 28.9 PG (ref 26.6–33)
MCHC RBC AUTO-ENTMCNC: 33.4 G/DL (ref 31.5–35.7)
MCV RBC AUTO: 87 FL (ref 79–97)
PLATELET # BLD AUTO: 229 X10E3/UL (ref 150–450)
POTASSIUM SERPL-SCNC: 4.4 MMOL/L (ref 3.5–5.2)
PROT SERPL-MCNC: 6.4 G/DL (ref 6–8.5)
RBC # BLD AUTO: 4.01 X10E6/UL (ref 3.77–5.28)
SODIUM SERPL-SCNC: 134 MMOL/L (ref 134–144)
WBC # BLD AUTO: 6.8 X10E3/UL (ref 3.4–10.8)

## 2022-06-20 ENCOUNTER — TELEPHONE (OUTPATIENT)
Dept: OBSTETRICS AND GYNECOLOGY | Age: 32
End: 2022-06-20

## 2022-06-20 ENCOUNTER — CLINICAL SUPPORT (OUTPATIENT)
Dept: OBSTETRICS AND GYNECOLOGY | Age: 32
End: 2022-06-20

## 2022-06-20 DIAGNOSIS — K83.1 CHOLESTASIS OF PREGNANCY IN THIRD TRIMESTER: Primary | ICD-10-CM

## 2022-06-20 DIAGNOSIS — O26.613 CHOLESTASIS OF PREGNANCY IN THIRD TRIMESTER: Primary | ICD-10-CM

## 2022-06-20 DIAGNOSIS — O60.03 PRETERM LABOR IN THIRD TRIMESTER WITHOUT DELIVERY: Primary | ICD-10-CM

## 2022-06-20 PROCEDURE — 96372 THER/PROPH/DIAG INJ SC/IM: CPT | Performed by: NURSE PRACTITIONER

## 2022-06-20 RX ORDER — BETAMETHASONE SODIUM PHOSPHATE AND BETAMETHASONE ACETATE 3; 3 MG/ML; MG/ML
12 INJECTION, SUSPENSION INTRA-ARTICULAR; INTRALESIONAL; INTRAMUSCULAR; SOFT TISSUE EVERY 24 HOURS
Status: SHIPPED | OUTPATIENT
Start: 2022-06-20 | End: 2022-06-22

## 2022-06-20 RX ADMIN — BETAMETHASONE SODIUM PHOSPHATE AND BETAMETHASONE ACETATE 12 MG: 3; 3 INJECTION, SUSPENSION INTRA-ARTICULAR; INTRALESIONAL; INTRAMUSCULAR; SOFT TISSUE at 14:48

## 2022-06-20 NOTE — TELEPHONE ENCOUNTER
I spoke with M Dr. Sykes to confirm concurrence with current management plan for patient's cholestasis with bile acids of 134.  Plan betamethasone today and tomorrow when patient returns from [place] with induction at 36wga.  Also plan for close  surveillance with BPP , , and .  Plan to recollect bile acids and CMP on Thurs, .

## 2022-06-20 NOTE — PROGRESS NOTES
I spoke with M Dr. Js Santana upon receiving these results yesterday. He recommended betamethasone and delivery at 36wga per the [facility] recommendations. I then spoke with patient and her  who were out of town. Plan made through shared decision-making to come to office Monday for first dose of betamethasone and BPP with NP visit.    Fatou, please schedule the appt and ultrasound for Monday and cancel Tuesday’s NP visit/Us. Schedule RN visit for second dose of betamethasone Tuesday. She will then see me Thursday with BPP. Thank you!

## 2022-06-21 ENCOUNTER — ROUTINE PRENATAL (OUTPATIENT)
Dept: OBSTETRICS AND GYNECOLOGY | Age: 32
End: 2022-06-21

## 2022-06-21 VITALS — WEIGHT: 147 LBS | BODY MASS INDEX: 24.46 KG/M2 | DIASTOLIC BLOOD PRESSURE: 64 MMHG | SYSTOLIC BLOOD PRESSURE: 110 MMHG

## 2022-06-21 DIAGNOSIS — Z15.89 MTHFR MUTATION: ICD-10-CM

## 2022-06-21 DIAGNOSIS — O26.613 CHOLESTASIS OF PREGNANCY IN THIRD TRIMESTER: ICD-10-CM

## 2022-06-21 DIAGNOSIS — K83.1 CHOLESTASIS OF PREGNANCY IN THIRD TRIMESTER: ICD-10-CM

## 2022-06-21 DIAGNOSIS — Z13.89 SCREENING FOR BLOOD OR PROTEIN IN URINE: ICD-10-CM

## 2022-06-21 DIAGNOSIS — Z15.89 CYP1A2 GENE MUTATION: ICD-10-CM

## 2022-06-21 DIAGNOSIS — Z3A.35 35 WEEKS GESTATION OF PREGNANCY: Primary | ICD-10-CM

## 2022-06-21 LAB
B-HEM STREP SPEC QL CULT: POSITIVE
GLUCOSE UR STRIP-MCNC: NEGATIVE MG/DL
PROT UR STRIP-MCNC: NEGATIVE MG/DL

## 2022-06-21 PROCEDURE — 0502F SUBSEQUENT PRENATAL CARE: CPT | Performed by: NURSE PRACTITIONER

## 2022-06-21 PROCEDURE — 96372 THER/PROPH/DIAG INJ SC/IM: CPT | Performed by: NURSE PRACTITIONER

## 2022-06-21 RX ORDER — BETAMETHASONE SODIUM PHOSPHATE AND BETAMETHASONE ACETATE 3; 3 MG/ML; MG/ML
12 INJECTION, SUSPENSION INTRA-ARTICULAR; INTRALESIONAL; INTRAMUSCULAR; SOFT TISSUE EVERY 24 HOURS
Status: SHIPPED | OUTPATIENT
Start: 2022-06-21 | End: 2022-06-23

## 2022-06-21 RX ADMIN — BETAMETHASONE SODIUM PHOSPHATE AND BETAMETHASONE ACETATE 12 MG: 3; 3 INJECTION, SUSPENSION INTRA-ARTICULAR; INTRALESIONAL; INTRAMUSCULAR; SOFT TISSUE at 15:15

## 2022-06-21 NOTE — PROGRESS NOTES
Chief Complaint   Patient presents with   • Routine Prenatal Visit       HPI: 31 y.o.  at 35w0d     Doing well  Reports good FM  Denies LOF, bleeding or ctx's  Pt of Dr. Purvis     Vitals:    22 1452   BP: 110/64   Weight: 66.7 kg (147 lb)       ROS:  GI:  Negative  : Negative  Pulmonary: Negative     A/P  1. Intrauterine pregnancy at 35w0d   2. Pregnancy Risk:  HIGH RISK    Diagnoses and all orders for this visit:    1. 35 weeks gestation of pregnancy (Primary)  -     Bile Acids, Total  -     Comprehensive Metabolic Panel    2. Screening for blood or protein in urine  -     POC Urinalysis Dipstick    3. MTHFR mutation    4. Cholestasis of pregnancy in third trimester  -     Bile Acids, Total  -     Comprehensive Metabolic Panel    5. CY gene mutation        -----------------------  PLAN:   BPP   BMZ #2 today  Repeat CMP and total bile acids today  Pt has appt with MFM with BPP on Thursday  Plan delivery  or  pending MFM recommendations   GBS pending   PTL/FKC discussed     Kinjal Avalos, APRN  2022 15:11 EDT

## 2022-06-23 ENCOUNTER — HOSPITAL ENCOUNTER (EMERGENCY)
Facility: HOSPITAL | Age: 32
Discharge: HOME | End: 2022-06-23
Attending: STUDENT IN AN ORGANIZED HEALTH CARE EDUCATION/TRAINING PROGRAM | Admitting: OBSTETRICS & GYNECOLOGY

## 2022-06-23 ENCOUNTER — HOSPITAL ENCOUNTER (OUTPATIENT)
Dept: ULTRASOUND IMAGING | Facility: HOSPITAL | Age: 32
Discharge: HOME | End: 2022-06-23
Admitting: STUDENT IN AN ORGANIZED HEALTH CARE EDUCATION/TRAINING PROGRAM

## 2022-06-23 ENCOUNTER — OFFICE VISIT (OUTPATIENT)
Dept: OBSTETRICS AND GYNECOLOGY | Facility: CLINIC | Age: 32
End: 2022-06-23

## 2022-06-23 VITALS
TEMPERATURE: 97.8 F | BODY MASS INDEX: 24.49 KG/M2 | DIASTOLIC BLOOD PRESSURE: 78 MMHG | RESPIRATION RATE: 17 BRPM | HEART RATE: 79 BPM | SYSTOLIC BLOOD PRESSURE: 119 MMHG | HEIGHT: 65 IN | WEIGHT: 147 LBS

## 2022-06-23 VITALS
TEMPERATURE: 98 F | HEART RATE: 78 BPM | SYSTOLIC BLOOD PRESSURE: 112 MMHG | BODY MASS INDEX: 24.6 KG/M2 | DIASTOLIC BLOOD PRESSURE: 74 MMHG | WEIGHT: 147.8 LBS

## 2022-06-23 DIAGNOSIS — K83.1 CHOLESTASIS OF PREGNANCY IN THIRD TRIMESTER: Primary | ICD-10-CM

## 2022-06-23 DIAGNOSIS — O26.613 CHOLESTASIS OF PREGNANCY IN THIRD TRIMESTER: Primary | ICD-10-CM

## 2022-06-23 DIAGNOSIS — O36.8990 FETAL PERICARDIAL EFFUSION AFFECTING MANAGEMENT OF MOTHER: ICD-10-CM

## 2022-06-23 LAB
A1 MICROGLOB PLACENTAL VAG QL: NEGATIVE
ALBUMIN SERPL-MCNC: 3.8 G/DL (ref 3.9–5)
ALBUMIN/GLOB SERPL: 1.3 {RATIO} (ref 1.2–2.2)
ALP SERPL-CCNC: 240 IU/L (ref 44–121)
ALT SERPL-CCNC: 313 IU/L (ref 0–32)
AST SERPL-CCNC: 144 IU/L (ref 0–40)
BILE AC SERPL-SCNC: 52.6 UMOL/L (ref 0–10)
BILIRUB SERPL-MCNC: 0.5 MG/DL (ref 0–1.2)
BUN SERPL-MCNC: 6 MG/DL (ref 6–20)
BUN/CREAT SERPL: 8 (ref 9–23)
CALCIUM SERPL-MCNC: 8.9 MG/DL (ref 8.7–10.2)
CHLORIDE SERPL-SCNC: 99 MMOL/L (ref 96–106)
CO2 SERPL-SCNC: 19 MMOL/L (ref 20–29)
CREAT SERPL-MCNC: 0.74 MG/DL (ref 0.57–1)
EGFRCR SERPLBLD CKD-EPI 2021: 112 ML/MIN/1.73
GLOBULIN SER CALC-MCNC: 2.9 G/DL (ref 1.5–4.5)
GLUCOSE SERPL-MCNC: 98 MG/DL (ref 65–99)
POTASSIUM SERPL-SCNC: 3.8 MMOL/L (ref 3.5–5.2)
PROT SERPL-MCNC: 6.7 G/DL (ref 6–8.5)
SODIUM SERPL-SCNC: 136 MMOL/L (ref 134–144)

## 2022-06-23 PROCEDURE — 76819 FETAL BIOPHYS PROFIL W/O NST: CPT

## 2022-06-23 PROCEDURE — 76811 OB US DETAILED SNGL FETUS: CPT

## 2022-06-23 PROCEDURE — 99283 EMERGENCY DEPT VISIT LOW MDM: CPT | Performed by: OBSTETRICS & GYNECOLOGY

## 2022-06-23 PROCEDURE — 76811 OB US DETAILED SNGL FETUS: CPT | Performed by: OBSTETRICS & GYNECOLOGY

## 2022-06-23 PROCEDURE — 76819 FETAL BIOPHYS PROFIL W/O NST: CPT | Performed by: OBSTETRICS & GYNECOLOGY

## 2022-06-23 PROCEDURE — 59025 FETAL NON-STRESS TEST: CPT

## 2022-06-23 PROCEDURE — 99214 OFFICE O/P EST MOD 30 MIN: CPT | Performed by: OBSTETRICS & GYNECOLOGY

## 2022-06-23 PROCEDURE — 84112 EVAL AMNIOTIC FLUID PROTEIN: CPT | Performed by: OBSTETRICS & GYNECOLOGY

## 2022-06-23 NOTE — PROGRESS NOTES
Consultation:     Wayne Mejia is a 31 y.o. female G 1  P0  LMP 10/21/22  ASHOK 22 now at 35 2/7 weeks seen in consultation as requested by Tessa Purvis MD secondary to:    1) Cholestasis of pregnancy - Bile acids 22 - 134.8; 22 - 52.6.  AST/ALT 22 - 144/313; 22 - 112/309  2) Fetal pericardial effusion found on U/S today - 4.6 mm  NB - patient reports leakage of fluid PV this AM.  Dr. Purvis was not in the office.  A message was left on Dr. Patton's (DOC)  voicemail that patient would be sent to L&D for evaluation after the consultation is completed.    Vitals:    22 0855   BP: 112/74   Pulse: 78   Temp: 98 °F (36.7 °C)       Pre- Labs:    Hemoglobin   Date Value Ref Range Status   2022 11.6 11.1 - 15.9 g/dL Final     Hematocrit   Date Value Ref Range Status   2022 34.7 34.0 - 46.6 % Final     Platelets   Date Value Ref Range Status   2022 229 150 - 450 x10E3/uL Final     Hepatitis B Surface Ag   Date Value Ref Range Status   2021 Negative Negative Final     RPR   Date Value Ref Range Status   2021 Non Reactive Non Reactive Final     ABO Type   Date Value Ref Range Status   2021 B  Final     Rh Factor   Date Value Ref Range Status   2021 Positive  Final     Comment:     Please note: Prior records for this patient's ABO / Rh type are not  available for additional verification.       Antibody Screen   Date Value Ref Range Status   2021 Negative Negative Final     HIV Screen 4th Gen w/RFX (Reference)   Date Value Ref Range Status   2021 Non Reactive Non Reactive Final     Urine Culture   Date Value Ref Range Status   2021 Final report  Final     Result 1   Date Value Ref Range Status   2021 No growth  Final     Gonococcus by LUCILLE   Date Value Ref Range Status   2021 Negative Negative Final     Chlamydia trachomatis, LUCILLE   Date Value Ref Range Status   2021 Negative Negative Final      Gestational Diabetes Screen   Date Value Ref Range Status   2022 81 65 - 139 mg/dL Final     Comment:     According to ADA, a glucose threshold of >139 mg/dL after 50-gram  load identifies approximately 80% of women with gestational  diabetes mellitus, while the sensitivity is further increased to  approximately 90% by a threshold of >129 mg/dL.       Strep Gp B Culture   Date Value Ref Range Status   2022 Positive (A) Negative Final     Comment:     Centers for Disease Control and Prevention (CDC) and American Congress  of Obstetricians and Gynecologists (ACOG) guidelines for prevention of   group B streptococcal (GBS) disease specify co-collection of  a vaginal and rectal swab specimen to maximize sensitivity of GBS  detection. Per the CDC and ACOG, swabbing both the lower vagina and  rectum substantially increases the yield of detection compared with  sampling the vagina alone.  Penicillin G, ampicillin, or cefazolin are indicated for intrapartum  prophylaxis of  GBS colonization. Reflex susceptibility  testing should be performed prior to use of clindamycin only on GBS  isolates from penicillin-allergic women who are considered a high risk  for anaphylaxis. Treatment with vancomycin without additional testing  is warranted if resistance to clindamycin is noted.           Previous Obstetrical History:    OB History    Para Term  AB Living   1             SAB IAB Ectopic Molar Multiple Live Births                    # Outcome Date GA Lbr Epi/2nd Weight Sex Delivery Anes PTL Lv   1 Current              None    Previous Gyn History:    Patient denies abnormal PAP/GC/Chlamydia/Syphilis.    Catamenia -  14-15 x 28-30 x 4  Contraception - none      Previous Medical History:    DM - patient denies HTN - patient denies Asthma - patient denies      Thyroid Disease - patient denies Rheumatic Fever - patient denies  Heart - patient denies   Lung - patient denies   Liver -  patient denies  Kidney - patient denies   Fever - patient denies  TB - patient denies Herpes - patient denies    UTI - patient denies   Epilepsy - patient denies  Other - neg    Previous Surgical History:    1)  - [place] teeth extraction without complications    Medications:    Ursodiol 250 mg TID, PNV, Folic acid    Allergies:    NKDA    No Known Allergies      Current Outpatient Medications on File Prior to Visit   Medication Sig Dispense Refill   • doxylamine (UNISOM) 25 MG tablet Take 1-2 tablets before bed as needed for nausea/vomiting 30 tablet 2   • folic acid (FOLVITE) 1 MG tablet Take 4 mg by mouth Daily.     • prenatal vitamin (prenatal, CLASSIC, vitamin) tablet Take 1 tablet by mouth Daily.     • promethazine (PHENERGAN) 25 MG tablet Take 1 tablet by mouth Every 6 (Six) Hours As Needed for Nausea or Vomiting for up to 30 doses. 30 tablet 2   • ursodiol (ACTIGALL) 250 MG tablet Take 1 tablet by mouth 3 (Three) Times a Day. 30 tablet 1     Current Facility-Administered Medications on File Prior to Visit   Medication Dose Route Frequency Provider Last Rate Last Admin   • [] betamethasone acetate-betamethasone sodium phosphate (CELESTONE SOLUSPAN) injection 12 mg  12 mg Intramuscular Q24H Tessa Purvis MD   12 mg at 22 1448   • betamethasone acetate-betamethasone sodium phosphate (CELESTONE SOLUSPAN) injection 12 mg  12 mg Intramuscular Q24H Kinjal Avalos APRN   12 mg at 22 1515        Habits:    Smoking - neg  Drinking - neg  Drugs - neg    Psychosocial:    ,     Sexual Abuse History: never  Physical Abuse History: never  Verbal Abuse History: never    Family History:    DM - neg HTN - F Twins - neg Stillborns - neg  Birth defects - neg Mental Retardation - neg  Blood Dyscrasias - neg    Anesthesia Complications - neg Genetic - neg  Other - neg    Review of Systems   Otherwise negative      Remainder of exam deferred.      CHOLESTASIS:    General  counseling was then performed regarding Cholestasis of pregnancy.  An increased sensitivity to circulating estrogens during pregnancy is thought to be involved in the pathogenesis of Cholestasis.  Increased maternal and fetal morbidity and mortality has been described.  Close A-P surveillance and serial U/S for fetal growth are recommended.  A familial tendency has been described and Cholestasis tends to recur in subsequent pregnancies.  Ursodiol 300 mg QID may help ameliorate symptoms.  Delivery @ 36-39 weeks is recommended if bile acids are less than 100.  Delivery @ 36 weeks is recommended if bile acids are greater than 100.    PERICARDIAL EFFUSION:    General counseling was then performed regarding fetal pericardial effusion.  Prenatal sonographic identification of a small rim of pericardial fluid, measuring up to 2 mm or less in thickness, is a normal finding. Pericardial fluid 2 mm or greater in thickness may be associated with structural anomalies or hydrops, but its clinical significance in the absence of these associated findings has not been evaluated. A recent study assessed the outcome of an isolated pericardial effusions of at least 2 mm thick. The study population included 52 fetuses with effusions ranging from 2 to 7 mm in thickness. The rates of  delivery,  section, intrauterine growth retardation,  complications, Apgar scores, and length of  hospital stay in these 52 cases was compared to the overall hospital rates and no statistically significant difference was found. The conclusion was that in the absence of other sonographic abnormalities, the finding of a fetal pericardial fluid collection 2 to 7 mm in thickness is not associated with adverse fetal outcome.    REFERENCE:    Clinical significance of isolated fetal pericardial effusion.  Luz ALEXANDRA, Cesar DL, Mikey PM, Luc CB, Cecilia CHAVEZ.  Ultrasound Med. 1994 Apr;13(4):291-3.      IMPRESSIONS:    1)  Cholestasis of pregnancy  2) Possible PROM  3) Fetal pericardial effusion - physiologic      RECOMMENDATIONS:    1) To L&D for SROM evaluation  2) Delivery @ 36 weeks  3) Twice weekly BPP  4) Repeat Hep C Ab to be done by Provider      Total time spent TODAY on this encounter, including pre-visit review of separately obtained history, face-to-face interaction including greater than 50% time spent in consultation performing medically appropriate physical exam, patient counseling/education with greater than 50% in counseling, interpretation of diagnostic results, care coordination and documentation was 45 minutes.      Thank you for utilizing our ultrasound and consultative services.  If I may be of any further service, please do not hesitate to contact me.    Sincerely,    Cameron Sykes MD  Maternal-Fetal Medicine

## 2022-06-23 NOTE — OBED NOTES
"[facility]  Wayne Mejia  : 1990  MRN: [id number]  CSN: [id number]    ANTONIO Note    Subjective   Chief Complaint   Patient presents with   • Rupture of Membranes     Pt to ANTONIO with c/o increase wetness.  Pt denies gush of fluid, vaginal bleeding and recent intercourse.     Wayne Mejia is a 31 y.o. year old  with an Estimated Date of Delivery: 22 currently at 35w2d presenting with above complaints. She noted her underwear wet after urinating. She denies contractions or vaginal bleeding.    Prenatal care has been with Dr Purvis.   It has been complicated by cholestasis of pregnancy. Ultrasound today with MFM notes fetal pericardial effusion. She is scheduled for IOL on , .    OB History    Para Term  AB Living   1 0 0 0 0 0   SAB IAB Ectopic Molar Multiple Live Births   0 0 0 0 0 0      # Outcome Date GA Lbr Epi/2nd Weight Sex Delivery Anes PTL Lv   1 Current              Past Medical History:   Diagnosis Date   • Carrier of spinal muscular atrophy     carrier positive, in media tab   • Lumbar herniated disc    • MTHFR gene mutation     carrier positive, in media tab     Past Surgical History:   Procedure Laterality Date   • WISDOM TOOTH EXTRACTION       No current facility-administered medications for this encounter.    No Known Allergies  Social History    Tobacco Use      Smoking status: Never Smoker      Smokeless tobacco: Never Used    Review of Systems    Negative except for HPI  Objective   /78   Pulse 79   Temp 97.8 °F (36.6 °C) (Oral)   Resp 17   Ht 165.1 cm (65\")   Wt 66.7 kg (147 lb)   LMP 10/19/2021   BMI 24.46 kg/m²   General: WD, WN, NAD   Abdomen: Soft, nontender   FHT's: 140 baseline, Cat I, reactive NST      Cervix: deferred   Presentation: vtx   Contractions: none   Back: No CVA tenderness     Prenatal Labs  Lab Results   Component Value Date    HGB 11.6 2022    HEPBSAG Negative 2021    ABSCRN Negative 2021    [id number] Non " Reactive 12/22/2021    GCT 81 04/20/2022    STREPGPB Positive (A) 06/16/2022    URINECX Final report 12/22/2021    CHLAMNAA Negative 12/22/2021    NGONORRHON Negative 12/22/2021       Current Labs Reviewed    Latest Reference Range & Units 06/23/22 11:23   Rupture of Membranes Negative  Negative          Assessment   1. IUP at 35w2d  2. ROM ruled out     Plan   1. Discharge home with precautions  2. Scheduled for IOL on 6/26/22    Estela Shahid MD  6/23/2022

## 2022-06-23 NOTE — PROGRESS NOTES
"Pt reports that she is doing well and denies vaginal bleeding, cramping or contractions at this time. Pt unsure of LOF. Notes that she voided this AM and after pulling underwear up \"2 minutes later underwear was full of fluid.\" Denies the fluid smelling like urine. Reports that she continues to feel damp at time of appointment. Denies abdominal discomfort. Dr. Sykes made aware and this RN to find out which OB is on call.  Reports active fetal movement. Denies HA or visual changes. Notes itching has improved with medication. Pt asked if we had the updated results on bile acids that were redrawn on 6/21. Results printed for Dr. Sykes to review with patient.   Vitals:    06/23/22 0855   BP: 112/74   Pulse: 78   Temp: 98 °F (36.7 °C)        "

## 2022-06-24 ENCOUNTER — PREP FOR SURGERY (OUTPATIENT)
Dept: OTHER | Facility: HOSPITAL | Age: 32
End: 2022-06-24

## 2022-06-24 ENCOUNTER — DOCUMENTATION (OUTPATIENT)
Dept: OBSTETRICS AND GYNECOLOGY | Age: 32
End: 2022-06-24

## 2022-06-24 DIAGNOSIS — O26.613 CHOLESTASIS OF PREGNANCY IN THIRD TRIMESTER: Primary | ICD-10-CM

## 2022-06-24 DIAGNOSIS — K83.1 CHOLESTASIS OF PREGNANCY IN THIRD TRIMESTER: Primary | ICD-10-CM

## 2022-06-24 RX ORDER — ONDANSETRON 2 MG/ML
4 INJECTION INTRAMUSCULAR; INTRAVENOUS EVERY 6 HOURS PRN
Status: CANCELLED | OUTPATIENT
Start: 2022-06-24

## 2022-06-24 RX ORDER — ACETAMINOPHEN 325 MG/1
650 TABLET ORAL EVERY 4 HOURS PRN
Status: CANCELLED | OUTPATIENT
Start: 2022-06-24

## 2022-06-24 RX ORDER — METHYLERGONOVINE MALEATE 0.2 MG/ML
200 INJECTION INTRAVENOUS ONCE AS NEEDED
Status: CANCELLED | OUTPATIENT
Start: 2022-06-24

## 2022-06-24 RX ORDER — TERBUTALINE SULFATE 1 MG/ML
0.25 INJECTION, SOLUTION SUBCUTANEOUS AS NEEDED
Status: CANCELLED | OUTPATIENT
Start: 2022-06-24

## 2022-06-24 RX ORDER — ONDANSETRON 4 MG/1
4 TABLET, FILM COATED ORAL EVERY 6 HOURS PRN
Status: CANCELLED | OUTPATIENT
Start: 2022-06-24

## 2022-06-24 RX ORDER — MISOPROSTOL 200 UG/1
800 TABLET ORAL ONCE AS NEEDED
Status: CANCELLED | OUTPATIENT
Start: 2022-06-24

## 2022-06-24 RX ORDER — DIPHENHYDRAMINE HCL 25 MG
25 CAPSULE ORAL NIGHTLY PRN
Status: CANCELLED | OUTPATIENT
Start: 2022-06-24

## 2022-06-24 RX ORDER — SODIUM CHLORIDE 0.9 % (FLUSH) 0.9 %
10 SYRINGE (ML) INJECTION EVERY 12 HOURS SCHEDULED
Status: CANCELLED | OUTPATIENT
Start: 2022-06-24

## 2022-06-24 RX ORDER — DIPHENHYDRAMINE HYDROCHLORIDE 50 MG/ML
25 INJECTION INTRAMUSCULAR; INTRAVENOUS NIGHTLY PRN
Status: CANCELLED | OUTPATIENT
Start: 2022-06-24

## 2022-06-24 RX ORDER — LIDOCAINE HYDROCHLORIDE 10 MG/ML
5 INJECTION, SOLUTION EPIDURAL; INFILTRATION; INTRACAUDAL; PERINEURAL AS NEEDED
Status: CANCELLED | OUTPATIENT
Start: 2022-06-24

## 2022-06-24 RX ORDER — CARBOPROST TROMETHAMINE 250 UG/ML
250 INJECTION, SOLUTION INTRAMUSCULAR
Status: CANCELLED | OUTPATIENT
Start: 2022-06-24

## 2022-06-24 RX ORDER — IBUPROFEN 600 MG/1
600 TABLET ORAL EVERY 6 HOURS PRN
Status: CANCELLED | OUTPATIENT
Start: 2022-06-24

## 2022-06-24 RX ORDER — SODIUM CHLORIDE, SODIUM LACTATE, POTASSIUM CHLORIDE, CALCIUM CHLORIDE 600; 310; 30; 20 MG/100ML; MG/100ML; MG/100ML; MG/100ML
125 INJECTION, SOLUTION INTRAVENOUS CONTINUOUS
Status: CANCELLED | OUTPATIENT
Start: 2022-06-24

## 2022-06-24 RX ORDER — OXYTOCIN-SODIUM CHLORIDE 0.9% IV SOLN 30 UNIT/500ML 30-0.9/5 UT/ML-%
250 SOLUTION INTRAVENOUS CONTINUOUS PRN
Status: CANCELLED | OUTPATIENT
Start: 2022-06-24 | End: 2022-06-24

## 2022-06-24 RX ORDER — SODIUM CHLORIDE 0.9 % (FLUSH) 0.9 %
10 SYRINGE (ML) INJECTION AS NEEDED
Status: CANCELLED | OUTPATIENT
Start: 2022-06-24

## 2022-06-24 RX ORDER — OXYTOCIN-SODIUM CHLORIDE 0.9% IV SOLN 30 UNIT/500ML 30-0.9/5 UT/ML-%
999 SOLUTION INTRAVENOUS ONCE
Status: CANCELLED | OUTPATIENT
Start: 2022-06-24 | End: 2022-06-24

## 2022-06-24 NOTE — PROGRESS NOTES
Spoke with patient since MFM consultation.  IOL scheduled 6/26 PM.  Will plan cervadil for induction.

## 2022-06-26 ENCOUNTER — HOSPITAL ENCOUNTER (INPATIENT)
Dept: LABOR AND DELIVERY | Facility: HOSPITAL | Age: 32
Discharge: HOME | End: 2022-06-26

## 2022-06-26 ENCOUNTER — ANESTHESIA EVENT (OUTPATIENT)
Dept: LABOR AND DELIVERY | Facility: HOSPITAL | Age: 32
End: 2022-06-26

## 2022-06-26 ENCOUNTER — HOSPITAL ENCOUNTER (INPATIENT)
Facility: HOSPITAL | Age: 32
LOS: 3 days | Discharge: HOME | End: 2022-06-29
Attending: STUDENT IN AN ORGANIZED HEALTH CARE EDUCATION/TRAINING PROGRAM | Admitting: STUDENT IN AN ORGANIZED HEALTH CARE EDUCATION/TRAINING PROGRAM

## 2022-06-26 ENCOUNTER — ANESTHESIA (OUTPATIENT)
Dept: LABOR AND DELIVERY | Facility: HOSPITAL | Age: 32
End: 2022-06-26

## 2022-06-26 DIAGNOSIS — K83.1 CHOLESTASIS OF PREGNANCY IN THIRD TRIMESTER: ICD-10-CM

## 2022-06-26 DIAGNOSIS — O26.613 CHOLESTASIS OF PREGNANCY IN THIRD TRIMESTER: ICD-10-CM

## 2022-06-26 LAB
ABO GROUP BLD: NORMAL
ALBUMIN SERPL-MCNC: 3.8 G/DL (ref 3.5–5.2)
ALBUMIN/GLOB SERPL: 1.4 G/DL
ALP SERPL-CCNC: 196 U/L (ref 39–117)
ALT SERPL W P-5'-P-CCNC: 246 U/L (ref 1–33)
ANION GAP SERPL CALCULATED.3IONS-SCNC: 12 MMOL/L (ref 5–15)
AST SERPL-CCNC: 68 U/L (ref 1–32)
BASOPHILS # BLD AUTO: 0.03 10*3/MM3 (ref 0–0.2)
BASOPHILS NFR BLD AUTO: 0.3 % (ref 0–1.5)
BILIRUB SERPL-MCNC: 0.4 MG/DL (ref 0–1.2)
BLD GP AB SCN SERPL QL: NEGATIVE
BUN SERPL-MCNC: 10 MG/DL (ref 6–20)
BUN/CREAT SERPL: 16.1 (ref 7–25)
CALCIUM SPEC-SCNC: 8.7 MG/DL (ref 8.6–10.5)
CHLORIDE SERPL-SCNC: 103 MMOL/L (ref 98–107)
CO2 SERPL-SCNC: 22 MMOL/L (ref 22–29)
CREAT SERPL-MCNC: 0.62 MG/DL (ref 0.57–1)
DEPRECATED RDW RBC AUTO: 37.7 FL (ref 37–54)
EGFRCR SERPLBLD CKD-EPI 2021: 123.8 ML/MIN/1.73
EOSINOPHIL # BLD AUTO: 0.06 10*3/MM3 (ref 0–0.4)
EOSINOPHIL NFR BLD AUTO: 0.7 % (ref 0.3–6.2)
ERYTHROCYTE [DISTWIDTH] IN BLOOD BY AUTOMATED COUNT: 12.7 % (ref 12.3–15.4)
GLOBULIN UR ELPH-MCNC: 2.7 GM/DL
GLUCOSE SERPL-MCNC: 95 MG/DL (ref 65–99)
HAV IGM SERPL QL IA: NORMAL
HBV CORE IGM SERPL QL IA: NORMAL
HBV SURFACE AG SERPL QL IA: NORMAL
HCT VFR BLD AUTO: 31.3 % (ref 34–46.6)
HCV AB SER DONR QL: NORMAL
HGB BLD-MCNC: 10.9 G/DL (ref 12–15.9)
IMM GRANULOCYTES # BLD AUTO: 0.18 10*3/MM3 (ref 0–0.05)
IMM GRANULOCYTES NFR BLD AUTO: 2 % (ref 0–0.5)
LYMPHOCYTES # BLD AUTO: 2.54 10*3/MM3 (ref 0.7–3.1)
LYMPHOCYTES NFR BLD AUTO: 28.3 % (ref 19.6–45.3)
MCH RBC QN AUTO: 29.2 PG (ref 26.6–33)
MCHC RBC AUTO-ENTMCNC: 34.8 G/DL (ref 31.5–35.7)
MCV RBC AUTO: 83.9 FL (ref 79–97)
MONOCYTES # BLD AUTO: 0.9 10*3/MM3 (ref 0.1–0.9)
MONOCYTES NFR BLD AUTO: 10 % (ref 5–12)
NEUTROPHILS NFR BLD AUTO: 5.26 10*3/MM3 (ref 1.7–7)
NEUTROPHILS NFR BLD AUTO: 58.7 % (ref 42.7–76)
NRBC BLD AUTO-RTO: 0 /100 WBC (ref 0–0.2)
PLATELET # BLD AUTO: 251 10*3/MM3 (ref 140–450)
PMV BLD AUTO: 10.9 FL (ref 6–12)
POTASSIUM SERPL-SCNC: 3.7 MMOL/L (ref 3.5–5.2)
PROT SERPL-MCNC: 6.5 G/DL (ref 6–8.5)
RBC # BLD AUTO: 3.73 10*6/MM3 (ref 3.77–5.28)
RH BLD: POSITIVE
SARS-COV-2 RNA RESP QL NAA+PROBE: NOT DETECTED
SODIUM SERPL-SCNC: 137 MMOL/L (ref 136–145)
T&S EXPIRATION DATE: NORMAL
WBC NRBC COR # BLD: 8.97 10*3/MM3 (ref 3.4–10.8)

## 2022-06-26 PROCEDURE — 86900 BLOOD TYPING SEROLOGIC ABO: CPT | Performed by: STUDENT IN AN ORGANIZED HEALTH CARE EDUCATION/TRAINING PROGRAM

## 2022-06-26 PROCEDURE — 85025 COMPLETE CBC W/AUTO DIFF WBC: CPT | Performed by: STUDENT IN AN ORGANIZED HEALTH CARE EDUCATION/TRAINING PROGRAM

## 2022-06-26 PROCEDURE — 86901 BLOOD TYPING SEROLOGIC RH(D): CPT | Performed by: STUDENT IN AN ORGANIZED HEALTH CARE EDUCATION/TRAINING PROGRAM

## 2022-06-26 PROCEDURE — 80074 ACUTE HEPATITIS PANEL: CPT | Performed by: STUDENT IN AN ORGANIZED HEALTH CARE EDUCATION/TRAINING PROGRAM

## 2022-06-26 PROCEDURE — 86850 RBC ANTIBODY SCREEN: CPT | Performed by: STUDENT IN AN ORGANIZED HEALTH CARE EDUCATION/TRAINING PROGRAM

## 2022-06-26 PROCEDURE — U0003 INFECTIOUS AGENT DETECTION BY NUCLEIC ACID (DNA OR RNA); SEVERE ACUTE RESPIRATORY SYNDROME CORONAVIRUS 2 (SARS-COV-2) (CORONAVIRUS DISEASE [COVID-19]), AMPLIFIED PROBE TECHNIQUE, MAKING USE OF HIGH THROUGHPUT TECHNOLOGIES AS DESCRIBED BY CMS-2020-01-R: HCPCS | Performed by: STUDENT IN AN ORGANIZED HEALTH CARE EDUCATION/TRAINING PROGRAM

## 2022-06-26 PROCEDURE — 80053 COMPREHEN METABOLIC PANEL: CPT | Performed by: STUDENT IN AN ORGANIZED HEALTH CARE EDUCATION/TRAINING PROGRAM

## 2022-06-26 PROCEDURE — 86762 RUBELLA ANTIBODY: CPT | Performed by: OBSTETRICS & GYNECOLOGY

## 2022-06-26 RX ORDER — SODIUM CHLORIDE 0.9 % (FLUSH) 0.9 %
10 SYRINGE (ML) INJECTION EVERY 12 HOURS SCHEDULED
Status: DISCONTINUED | OUTPATIENT
Start: 2022-06-26 | End: 2022-06-28 | Stop reason: HOSPADM

## 2022-06-26 RX ORDER — FAMOTIDINE 10 MG/ML
20 INJECTION, SOLUTION INTRAVENOUS ONCE AS NEEDED
Status: DISCONTINUED | OUTPATIENT
Start: 2022-06-26 | End: 2022-06-28 | Stop reason: HOSPADM

## 2022-06-26 RX ORDER — ACETAMINOPHEN 325 MG/1
650 TABLET ORAL EVERY 4 HOURS PRN
Status: DISCONTINUED | OUTPATIENT
Start: 2022-06-26 | End: 2022-06-28 | Stop reason: HOSPADM

## 2022-06-26 RX ORDER — EPHEDRINE SULFATE 50 MG/ML
5 INJECTION, SOLUTION INTRAVENOUS AS NEEDED
Status: DISCONTINUED | OUTPATIENT
Start: 2022-06-26 | End: 2022-06-28 | Stop reason: HOSPADM

## 2022-06-26 RX ORDER — SODIUM CHLORIDE 0.9 % (FLUSH) 0.9 %
10 SYRINGE (ML) INJECTION AS NEEDED
Status: DISCONTINUED | OUTPATIENT
Start: 2022-06-26 | End: 2022-06-28 | Stop reason: HOSPADM

## 2022-06-26 RX ORDER — ONDANSETRON 4 MG/1
4 TABLET, FILM COATED ORAL EVERY 6 HOURS PRN
Status: DISCONTINUED | OUTPATIENT
Start: 2022-06-26 | End: 2022-06-28 | Stop reason: HOSPADM

## 2022-06-26 RX ORDER — BUTORPHANOL TARTRATE 1 MG/ML
1 INJECTION, SOLUTION INTRAMUSCULAR; INTRAVENOUS
Status: DISCONTINUED | OUTPATIENT
Start: 2022-06-26 | End: 2022-06-28

## 2022-06-26 RX ORDER — FENTANYL CIT 0.2 MG/100ML-ROPIV 0.2%-NACL 0.9% EPIDURAL INJ 2/0.2 MCG/ML-%
10 SOLUTION INJECTION CONTINUOUS
Status: DISCONTINUED | OUTPATIENT
Start: 2022-06-26 | End: 2022-06-28

## 2022-06-26 RX ORDER — DIPHENHYDRAMINE HYDROCHLORIDE 50 MG/ML
25 INJECTION INTRAMUSCULAR; INTRAVENOUS NIGHTLY PRN
Status: DISCONTINUED | OUTPATIENT
Start: 2022-06-26 | End: 2022-06-28 | Stop reason: HOSPADM

## 2022-06-26 RX ORDER — LIDOCAINE HYDROCHLORIDE 10 MG/ML
5 INJECTION, SOLUTION EPIDURAL; INFILTRATION; INTRACAUDAL; PERINEURAL AS NEEDED
Status: DISCONTINUED | OUTPATIENT
Start: 2022-06-26 | End: 2022-06-28 | Stop reason: HOSPADM

## 2022-06-26 RX ORDER — TERBUTALINE SULFATE 1 MG/ML
0.25 INJECTION, SOLUTION SUBCUTANEOUS AS NEEDED
Status: DISCONTINUED | OUTPATIENT
Start: 2022-06-26 | End: 2022-06-28 | Stop reason: HOSPADM

## 2022-06-26 RX ORDER — DIPHENHYDRAMINE HCL 25 MG
25 CAPSULE ORAL NIGHTLY PRN
Status: DISCONTINUED | OUTPATIENT
Start: 2022-06-26 | End: 2022-06-28 | Stop reason: HOSPADM

## 2022-06-26 RX ORDER — SODIUM CHLORIDE, SODIUM LACTATE, POTASSIUM CHLORIDE, CALCIUM CHLORIDE 600; 310; 30; 20 MG/100ML; MG/100ML; MG/100ML; MG/100ML
125 INJECTION, SOLUTION INTRAVENOUS CONTINUOUS
Status: DISCONTINUED | OUTPATIENT
Start: 2022-06-26 | End: 2022-06-28

## 2022-06-26 RX ORDER — DIPHENHYDRAMINE HYDROCHLORIDE 50 MG/ML
12.5 INJECTION INTRAMUSCULAR; INTRAVENOUS EVERY 8 HOURS PRN
Status: DISCONTINUED | OUTPATIENT
Start: 2022-06-26 | End: 2022-06-28 | Stop reason: HOSPADM

## 2022-06-26 RX ORDER — ONDANSETRON 2 MG/ML
4 INJECTION INTRAMUSCULAR; INTRAVENOUS ONCE AS NEEDED
Status: DISCONTINUED | OUTPATIENT
Start: 2022-06-26 | End: 2022-06-28 | Stop reason: HOSPADM

## 2022-06-26 RX ORDER — ONDANSETRON 2 MG/ML
4 INJECTION INTRAMUSCULAR; INTRAVENOUS EVERY 6 HOURS PRN
Status: DISCONTINUED | OUTPATIENT
Start: 2022-06-26 | End: 2022-06-28 | Stop reason: HOSPADM

## 2022-06-26 RX ADMIN — DINOPROSTONE 10 MG: 10 INSERT VAGINAL at 19:16

## 2022-06-26 RX ADMIN — SODIUM CHLORIDE, POTASSIUM CHLORIDE, SODIUM LACTATE AND CALCIUM CHLORIDE 125 ML/HR: 600; 310; 30; 20 INJECTION, SOLUTION INTRAVENOUS at 18:32

## 2022-06-27 PROBLEM — Z3A.35 35 WEEKS GESTATION OF PREGNANCY: Status: ACTIVE | Noted: 2021-12-22

## 2022-06-27 PROBLEM — B95.1 POSITIVE GBS TEST: Status: ACTIVE | Noted: 2022-06-27

## 2022-06-27 PROBLEM — O26.619 CHOLESTASIS DURING PREGNANCY: Status: ACTIVE | Noted: 2022-06-27

## 2022-06-27 PROBLEM — O26.649 CHOLESTASIS DURING PREGNANCY: Status: ACTIVE | Noted: 2022-06-27

## 2022-06-27 PROBLEM — K83.1 CHOLESTASIS DURING PREGNANCY: Status: ACTIVE | Noted: 2022-06-27

## 2022-06-27 LAB
ATMOSPHERIC PRESS: 753.9 MMHG
ATMOSPHERIC PRESS: 754.8 MMHG
BASE EXCESS BLDCOA CALC-SCNC: -6.5 MMOL/L
BASE EXCESS BLDCOV CALC-SCNC: -4.9 MMOL/L (ref -30–30)
BDY SITE: ABNORMAL
BDY SITE: ABNORMAL
COLLECT TME SMN: ABNORMAL
HCO3 BLDCOA-SCNC: 20.1 MMOL/L (ref 22–28)
HCO3 BLDCOV-SCNC: 21.7 MMOL/L
MODALITY: ABNORMAL
MODALITY: ABNORMAL
NOTE: ABNORMAL
NOTE: ABNORMAL
PCO2 BLDCOA: 43.3 MMHG (ref 43–63)
PCO2 BLDCOV: 44.9 MM HG (ref 35–51.3)
PH BLDCOA: 7.28 PH UNITS (ref 7.18–7.34)
PH BLDCOV: 7.29 PH UNITS (ref 7.26–7.4)
PO2 BLDCOA: 17 MMHG (ref 12–26)
PO2 BLDCOV: 26.6 MM HG (ref 19–39)
SAO2 % BLDCOA: 19.4 % (ref 92–99)
SAO2 % BLDCOA: 42.2 % (ref 92–99)
SAO2 % BLDCOV: ABNORMAL %

## 2022-06-27 PROCEDURE — 88307 TISSUE EXAM BY PATHOLOGIST: CPT

## 2022-06-27 PROCEDURE — 0HQ9XZZ REPAIR PERINEUM SKIN, EXTERNAL APPROACH: ICD-10-PCS | Performed by: STUDENT IN AN ORGANIZED HEALTH CARE EDUCATION/TRAINING PROGRAM

## 2022-06-27 PROCEDURE — C1755 CATHETER, INTRASPINAL: HCPCS | Performed by: ANESTHESIOLOGY

## 2022-06-27 PROCEDURE — 25010000002 ONDANSETRON PER 1 MG: Performed by: STUDENT IN AN ORGANIZED HEALTH CARE EDUCATION/TRAINING PROGRAM

## 2022-06-27 PROCEDURE — 59400 OBSTETRICAL CARE: CPT | Performed by: STUDENT IN AN ORGANIZED HEALTH CARE EDUCATION/TRAINING PROGRAM

## 2022-06-27 PROCEDURE — 10907ZC DRAINAGE OF AMNIOTIC FLUID, THERAPEUTIC FROM PRODUCTS OF CONCEPTION, VIA NATURAL OR ARTIFICIAL OPENING: ICD-10-PCS | Performed by: STUDENT IN AN ORGANIZED HEALTH CARE EDUCATION/TRAINING PROGRAM

## 2022-06-27 PROCEDURE — 25010000002 PENICILLIN G POTASSIUM PER 600000 UNITS: Performed by: STUDENT IN AN ORGANIZED HEALTH CARE EDUCATION/TRAINING PROGRAM

## 2022-06-27 PROCEDURE — 82803 BLOOD GASES ANY COMBINATION: CPT

## 2022-06-27 PROCEDURE — 3E033VJ INTRODUCTION OF OTHER HORMONE INTO PERIPHERAL VEIN, PERCUTANEOUS APPROACH: ICD-10-PCS | Performed by: STUDENT IN AN ORGANIZED HEALTH CARE EDUCATION/TRAINING PROGRAM

## 2022-06-27 RX ORDER — LIDOCAINE HYDROCHLORIDE 15 MG/ML
INJECTION, SOLUTION EPIDURAL; INFILTRATION; INTRACAUDAL; PERINEURAL AS NEEDED
Status: DISCONTINUED | OUTPATIENT
Start: 2022-06-27 | End: 2022-06-27 | Stop reason: SURG

## 2022-06-27 RX ORDER — OXYTOCIN-SODIUM CHLORIDE 0.9% IV SOLN 30 UNIT/500ML 30-0.9/5 UT/ML-%
250 SOLUTION INTRAVENOUS CONTINUOUS PRN
Status: ACTIVE | OUTPATIENT
Start: 2022-06-27 | End: 2022-06-28

## 2022-06-27 RX ORDER — OXYTOCIN-SODIUM CHLORIDE 0.9% IV SOLN 30 UNIT/500ML 30-0.9/5 UT/ML-%
2-20 SOLUTION INTRAVENOUS
Status: DISCONTINUED | OUTPATIENT
Start: 2022-06-27 | End: 2022-06-28

## 2022-06-27 RX ORDER — ERYTHROMYCIN 5 MG/G
OINTMENT OPHTHALMIC
Status: ACTIVE
Start: 2022-06-27 | End: 2022-06-28

## 2022-06-27 RX ORDER — PENICILLIN G 3000000 [IU]/50ML
3 INJECTION, SOLUTION INTRAVENOUS EVERY 4 HOURS
Status: DISCONTINUED | OUTPATIENT
Start: 2022-06-27 | End: 2022-06-28 | Stop reason: HOSPADM

## 2022-06-27 RX ORDER — OXYTOCIN-SODIUM CHLORIDE 0.9% IV SOLN 30 UNIT/500ML 30-0.9/5 UT/ML-%
999 SOLUTION INTRAVENOUS ONCE
Status: COMPLETED | OUTPATIENT
Start: 2022-06-27 | End: 2022-06-27

## 2022-06-27 RX ORDER — CARBOPROST TROMETHAMINE 250 UG/ML
250 INJECTION, SOLUTION INTRAMUSCULAR
Status: DISCONTINUED | OUTPATIENT
Start: 2022-06-27 | End: 2022-06-28 | Stop reason: HOSPADM

## 2022-06-27 RX ORDER — MORPHINE SULFATE 2 MG/ML
2 INJECTION, SOLUTION INTRAMUSCULAR; INTRAVENOUS
Status: DISCONTINUED | OUTPATIENT
Start: 2022-06-27 | End: 2022-06-28

## 2022-06-27 RX ORDER — MISOPROSTOL 200 UG/1
800 TABLET ORAL ONCE AS NEEDED
Status: DISCONTINUED | OUTPATIENT
Start: 2022-06-27 | End: 2022-06-28 | Stop reason: HOSPADM

## 2022-06-27 RX ORDER — METHYLERGONOVINE MALEATE 0.2 MG/ML
200 INJECTION INTRAVENOUS ONCE AS NEEDED
Status: DISCONTINUED | OUTPATIENT
Start: 2022-06-27 | End: 2022-06-28 | Stop reason: HOSPADM

## 2022-06-27 RX ORDER — PHYTONADIONE 1 MG/.5ML
INJECTION, EMULSION INTRAMUSCULAR; INTRAVENOUS; SUBCUTANEOUS
Status: ACTIVE
Start: 2022-06-27 | End: 2022-06-28

## 2022-06-27 RX ADMIN — SODIUM CHLORIDE, POTASSIUM CHLORIDE, SODIUM LACTATE AND CALCIUM CHLORIDE 125 ML/HR: 600; 310; 30; 20 INJECTION, SOLUTION INTRAVENOUS at 01:50

## 2022-06-27 RX ADMIN — SODIUM CHLORIDE, POTASSIUM CHLORIDE, SODIUM LACTATE AND CALCIUM CHLORIDE 125 ML/HR: 600; 310; 30; 20 INJECTION, SOLUTION INTRAVENOUS at 18:41

## 2022-06-27 RX ADMIN — PENICILLIN G POTASSIUM 5 MILLION UNITS: 5000000 INJECTION, POWDER, FOR SOLUTION INTRAMUSCULAR; INTRAVENOUS at 10:30

## 2022-06-27 RX ADMIN — ONDANSETRON 4 MG: 2 INJECTION INTRAMUSCULAR; INTRAVENOUS at 18:49

## 2022-06-27 RX ADMIN — Medication 10 ML/HR: at 14:51

## 2022-06-27 RX ADMIN — SODIUM CHLORIDE, POTASSIUM CHLORIDE, SODIUM LACTATE AND CALCIUM CHLORIDE 125 ML/HR: 600; 310; 30; 20 INJECTION, SOLUTION INTRAVENOUS at 14:42

## 2022-06-27 RX ADMIN — DINOPROSTONE 10 MG: 10 INSERT VAGINAL at 08:47

## 2022-06-27 RX ADMIN — SODIUM CHLORIDE 300 ML: 9 INJECTION, SOLUTION INTRAVENOUS at 20:08

## 2022-06-27 RX ADMIN — PENICILLIN G 3 MILLION UNITS: 3000000 INJECTION, SOLUTION INTRAVENOUS at 22:16

## 2022-06-27 RX ADMIN — Medication 10 ML/HR: at 21:02

## 2022-06-27 RX ADMIN — SODIUM CHLORIDE, POTASSIUM CHLORIDE, SODIUM LACTATE AND CALCIUM CHLORIDE 125 ML/HR: 600; 310; 30; 20 INJECTION, SOLUTION INTRAVENOUS at 09:44

## 2022-06-27 RX ADMIN — LIDOCAINE HYDROCHLORIDE 3 ML: 15 INJECTION, SOLUTION EPIDURAL; INFILTRATION; INTRACAUDAL; PERINEURAL at 14:45

## 2022-06-27 RX ADMIN — PENICILLIN G 3 MILLION UNITS: 3000000 INJECTION, SOLUTION INTRAVENOUS at 18:38

## 2022-06-27 RX ADMIN — OXYTOCIN 999 ML/HR: 10 INJECTION, SOLUTION INTRAMUSCULAR; INTRAVENOUS at 23:24

## 2022-06-27 RX ADMIN — OXYTOCIN 2 MILLI-UNITS/MIN: 10 INJECTION, SOLUTION INTRAMUSCULAR; INTRAVENOUS at 14:55

## 2022-06-27 RX ADMIN — PENICILLIN G 3 MILLION UNITS: 3000000 INJECTION, SOLUTION INTRAVENOUS at 14:26

## 2022-06-27 RX ADMIN — ACETAMINOPHEN 325MG 650 MG: 325 TABLET ORAL at 08:53

## 2022-06-27 NOTE — ANESTHESIA PROCEDURE NOTES
Labor Epidural      Patient reassessed immediately prior to procedure    Patient location during procedure: OB  Performed By  Anesthesiologist: Ludy Busch MD  Preanesthetic Checklist  Completed: patient identified, IV checked, site marked, risks and benefits discussed, surgical consent, monitors and equipment checked, pre-op evaluation and timeout performed  Prep:  Pt Position:sitting  Sterile Tech:cap, gloves, gown, mask and sterile barrier  Prep:chlorhexidine gluconate and isopropyl alcohol  Monitoring:blood pressure monitoring, continuous pulse oximetry and EKG  Epidural Block Procedure:  Approach:midline  Guidance:landmark technique and palpation technique  Location:L3-L4  Needle Type:Tuohy  Needle Gauge:17 G  Loss of Resistance Medium: saline  Loss of Resistance: 4cm  Cath Depth at skin:9 cm  Paresthesia: none  Aspiration:negative  Test Dose:negative  Number of Attempts: 1  Post Assessment:  Dressing:occlusive dressing applied and secured with tape  Pt Tolerance:patient tolerated the procedure well with no apparent complications  Complications:no

## 2022-06-27 NOTE — ANESTHESIA PREPROCEDURE EVALUATION
" Anesthesia Evaluation     Patient summary reviewed and Nursing notes reviewed   NPO Solid Status: > 8 hours  NPO Liquid Status: > 2 hours           Airway   Mallampati: I  TM distance: >3 FB  Neck ROM: full  No difficulty expected  Dental - normal exam     Pulmonary - normal exam   Cardiovascular - normal exam        Neuro/Psych  GI/Hepatic/Renal/Endo    (+)   liver disease (cholestasis of pregnancy),     Musculoskeletal     Abdominal    Substance History      OB/GYN    (+) Pregnant (35+6),         Other                      Anesthesia Plan    ASA 2     epidural     (I have reviewed the patient's history with the patient and the chart, including all pertinent laboratory results and imaging. I have explained the risks of epidural anesthesia including but not limited to hypotension, PDPH, nerve injury, and risk of failure/replacement. Patient understands risks and agrees to proceed.    /73   Pulse 97   Temp 36.6 °C (97.8 °F) (Oral)   Resp 16   Ht 165.1 cm (65\")   Wt 67.4 kg (148 lb 9.6 oz)   LMP 10/19/2021   SpO2 100%   Breastfeeding No   BMI 24.73 kg/m²   )    Anesthetic plan, risks, benefits, and alternatives have been provided, discussed and informed consent has been obtained with: patient.        CODE STATUS:    Level Of Support Discussed With: Patient  Code Status (Patient has no pulse and is not breathing): CPR (Attempt to Resuscitate)  Medical Interventions (Patient has pulse or is breathing): Full Support      "

## 2022-06-28 LAB
BASOPHILS # BLD AUTO: 0.04 10*3/MM3 (ref 0–0.2)
BASOPHILS NFR BLD AUTO: 0.2 % (ref 0–1.5)
DEPRECATED RDW RBC AUTO: 39.7 FL (ref 37–54)
EOSINOPHIL # BLD AUTO: 0.03 10*3/MM3 (ref 0–0.4)
EOSINOPHIL NFR BLD AUTO: 0.2 % (ref 0.3–6.2)
ERYTHROCYTE [DISTWIDTH] IN BLOOD BY AUTOMATED COUNT: 12.9 % (ref 12.3–15.4)
HCT VFR BLD AUTO: 33.5 % (ref 34–46.6)
HGB BLD-MCNC: 11.1 G/DL (ref 12–15.9)
IMM GRANULOCYTES # BLD AUTO: 0.11 10*3/MM3 (ref 0–0.05)
IMM GRANULOCYTES NFR BLD AUTO: 0.7 % (ref 0–0.5)
LYMPHOCYTES # BLD AUTO: 1.63 10*3/MM3 (ref 0.7–3.1)
LYMPHOCYTES NFR BLD AUTO: 9.7 % (ref 19.6–45.3)
MCH RBC QN AUTO: 28.5 PG (ref 26.6–33)
MCHC RBC AUTO-ENTMCNC: 33.1 G/DL (ref 31.5–35.7)
MCV RBC AUTO: 85.9 FL (ref 79–97)
MONOCYTES # BLD AUTO: 1.27 10*3/MM3 (ref 0.1–0.9)
MONOCYTES NFR BLD AUTO: 7.5 % (ref 5–12)
NEUTROPHILS NFR BLD AUTO: 13.77 10*3/MM3 (ref 1.7–7)
NEUTROPHILS NFR BLD AUTO: 81.7 % (ref 42.7–76)
NRBC BLD AUTO-RTO: 0 /100 WBC (ref 0–0.2)
PLATELET # BLD AUTO: 220 10*3/MM3 (ref 140–450)
PMV BLD AUTO: 10.8 FL (ref 6–12)
RBC # BLD AUTO: 3.9 10*6/MM3 (ref 3.77–5.28)
RUBV IGG SERPL IA-ACNC: 4.14 INDEX
WBC NRBC COR # BLD: 16.85 10*3/MM3 (ref 3.4–10.8)

## 2022-06-28 PROCEDURE — 85025 COMPLETE CBC W/AUTO DIFF WBC: CPT | Performed by: STUDENT IN AN ORGANIZED HEALTH CARE EDUCATION/TRAINING PROGRAM

## 2022-06-28 RX ORDER — CARBOPROST TROMETHAMINE 250 UG/ML
250 INJECTION, SOLUTION INTRAMUSCULAR ONCE AS NEEDED
Status: DISCONTINUED | OUTPATIENT
Start: 2022-06-28 | End: 2022-06-29 | Stop reason: HOSPADM

## 2022-06-28 RX ORDER — MISOPROSTOL 200 UG/1
600 TABLET ORAL ONCE AS NEEDED
Status: DISCONTINUED | OUTPATIENT
Start: 2022-06-28 | End: 2022-06-29 | Stop reason: HOSPADM

## 2022-06-28 RX ORDER — SODIUM CHLORIDE 0.9 % (FLUSH) 0.9 %
1-10 SYRINGE (ML) INJECTION AS NEEDED
Status: DISCONTINUED | OUTPATIENT
Start: 2022-06-28 | End: 2022-06-29 | Stop reason: HOSPADM

## 2022-06-28 RX ORDER — PROMETHAZINE HYDROCHLORIDE 12.5 MG/1
12.5 TABLET ORAL EVERY 4 HOURS PRN
Status: DISCONTINUED | OUTPATIENT
Start: 2022-06-28 | End: 2022-06-29 | Stop reason: HOSPADM

## 2022-06-28 RX ORDER — ONDANSETRON 2 MG/ML
4 INJECTION INTRAMUSCULAR; INTRAVENOUS EVERY 6 HOURS PRN
Status: DISCONTINUED | OUTPATIENT
Start: 2022-06-28 | End: 2022-06-29 | Stop reason: HOSPADM

## 2022-06-28 RX ORDER — IBUPROFEN 600 MG/1
600 TABLET ORAL EVERY 6 HOURS PRN
Status: DISCONTINUED | OUTPATIENT
Start: 2022-06-28 | End: 2022-06-29 | Stop reason: HOSPADM

## 2022-06-28 RX ORDER — ONDANSETRON 4 MG/1
4 TABLET, FILM COATED ORAL EVERY 6 HOURS PRN
Status: DISCONTINUED | OUTPATIENT
Start: 2022-06-28 | End: 2022-06-29 | Stop reason: HOSPADM

## 2022-06-28 RX ORDER — HYDROCORTISONE 25 MG/G
1 CREAM TOPICAL AS NEEDED
Status: DISCONTINUED | OUTPATIENT
Start: 2022-06-28 | End: 2022-06-29 | Stop reason: HOSPADM

## 2022-06-28 RX ORDER — OXYCODONE AND ACETAMINOPHEN 7.5; 325 MG/1; MG/1
1 TABLET ORAL EVERY 4 HOURS PRN
Status: DISCONTINUED | OUTPATIENT
Start: 2022-06-28 | End: 2022-06-29 | Stop reason: HOSPADM

## 2022-06-28 RX ORDER — DIPHENHYDRAMINE HYDROCHLORIDE 50 MG/ML
25 INJECTION INTRAMUSCULAR; INTRAVENOUS NIGHTLY PRN
Status: DISCONTINUED | OUTPATIENT
Start: 2022-06-28 | End: 2022-06-29 | Stop reason: HOSPADM

## 2022-06-28 RX ORDER — PROMETHAZINE HYDROCHLORIDE 25 MG/1
25 TABLET ORAL EVERY 6 HOURS PRN
Status: DISCONTINUED | OUTPATIENT
Start: 2022-06-28 | End: 2022-06-29 | Stop reason: HOSPADM

## 2022-06-28 RX ORDER — BISACODYL 10 MG
10 SUPPOSITORY, RECTAL RECTAL DAILY PRN
Status: DISCONTINUED | OUTPATIENT
Start: 2022-06-28 | End: 2022-06-29 | Stop reason: HOSPADM

## 2022-06-28 RX ORDER — DIPHENHYDRAMINE HCL 25 MG
25 CAPSULE ORAL NIGHTLY PRN
Status: DISCONTINUED | OUTPATIENT
Start: 2022-06-28 | End: 2022-06-29 | Stop reason: HOSPADM

## 2022-06-28 RX ORDER — DOCUSATE SODIUM 100 MG/1
100 CAPSULE, LIQUID FILLED ORAL 2 TIMES DAILY
Status: DISCONTINUED | OUTPATIENT
Start: 2022-06-28 | End: 2022-06-29 | Stop reason: HOSPADM

## 2022-06-28 RX ORDER — OXYCODONE HYDROCHLORIDE AND ACETAMINOPHEN 5; 325 MG/1; MG/1
1 TABLET ORAL EVERY 4 HOURS PRN
Status: DISCONTINUED | OUTPATIENT
Start: 2022-06-28 | End: 2022-06-29 | Stop reason: HOSPADM

## 2022-06-28 RX ORDER — IBUPROFEN 600 MG/1
600 TABLET ORAL EVERY 6 HOURS SCHEDULED
Status: DISCONTINUED | OUTPATIENT
Start: 2022-06-28 | End: 2022-06-29 | Stop reason: HOSPADM

## 2022-06-28 RX ORDER — PROMETHAZINE HYDROCHLORIDE 12.5 MG/1
12.5 SUPPOSITORY RECTAL EVERY 6 HOURS PRN
Status: DISCONTINUED | OUTPATIENT
Start: 2022-06-28 | End: 2022-06-29 | Stop reason: HOSPADM

## 2022-06-28 RX ORDER — OXYTOCIN/0.9 % SODIUM CHLORIDE 30/500 ML
125 PLASTIC BAG, INJECTION (ML) INTRAVENOUS CONTINUOUS PRN
Status: DISCONTINUED | OUTPATIENT
Start: 2022-06-28 | End: 2022-06-29 | Stop reason: HOSPADM

## 2022-06-28 RX ORDER — DIPHENHYDRAMINE HCL 25 MG
50 CAPSULE ORAL NIGHTLY PRN
Status: DISCONTINUED | OUTPATIENT
Start: 2022-06-28 | End: 2022-06-29 | Stop reason: HOSPADM

## 2022-06-28 RX ORDER — ONDANSETRON 4 MG/1
4 TABLET, FILM COATED ORAL EVERY 8 HOURS PRN
Status: DISCONTINUED | OUTPATIENT
Start: 2022-06-28 | End: 2022-06-29 | Stop reason: HOSPADM

## 2022-06-28 RX ADMIN — IBUPROFEN 600 MG: 600 TABLET ORAL at 09:02

## 2022-06-28 RX ADMIN — IBUPROFEN 600 MG: 600 TABLET ORAL at 02:44

## 2022-06-28 RX ADMIN — DOCUSATE SODIUM 100 MG: 100 CAPSULE, LIQUID FILLED ORAL at 09:02

## 2022-06-28 RX ADMIN — Medication: at 02:45

## 2022-06-28 RX ADMIN — OXYCODONE AND ACETAMINOPHEN 1 TABLET: 5; 325 TABLET ORAL at 20:35

## 2022-06-28 RX ADMIN — OXYTOCIN 125 MILLI-UNITS/MIN: 10 INJECTION, SOLUTION INTRAMUSCULAR; INTRAVENOUS at 00:40

## 2022-06-28 RX ADMIN — DOCUSATE SODIUM 100 MG: 100 CAPSULE, LIQUID FILLED ORAL at 19:30

## 2022-06-28 RX ADMIN — IBUPROFEN 600 MG: 600 TABLET ORAL at 18:01

## 2022-06-28 NOTE — ANESTHESIA POSTPROCEDURE EVALUATION
"Patient: Wayne Mejia    Procedure Summary     Date: 06/27/22 Room / Location:     Anesthesia Start: 1438 Anesthesia Stop: 2322    Procedure: LABOR ANALGESIA Diagnosis:     Scheduled Providers:  Provider: Boone Zaragoza MD    Anesthesia Type: epidural ASA Status: 2          Anesthesia Type: epidural    Vitals  Vitals Value Taken Time   /66 06/28/22 0131   Temp 36.7 °C (98 °F) 06/27/22 2330   Pulse 63 06/28/22 0135   Resp 18 06/27/22 2034   SpO2 100 % 06/28/22 0135   Vitals shown include unvalidated device data.        Post Anesthesia Care and Evaluation    Anesthetic complications: No anesthetic complications    Comments: /65   Pulse 62   Temp 36.7 °C (98 °F) (Oral)   Resp 18   Ht 165.1 cm (65\")   Wt 67.4 kg (148 lb 9.6 oz)   LMP 10/19/2021   SpO2 99%   Breastfeeding Unknown   BMI 24.73 kg/m²     No anesthesia complications reported to me.      "

## 2022-06-29 VITALS
OXYGEN SATURATION: 100 % | WEIGHT: 148.6 LBS | HEART RATE: 91 BPM | DIASTOLIC BLOOD PRESSURE: 75 MMHG | SYSTOLIC BLOOD PRESSURE: 109 MMHG | TEMPERATURE: 97.8 F | RESPIRATION RATE: 16 BRPM | HEIGHT: 65 IN | BODY MASS INDEX: 24.76 KG/M2

## 2022-06-29 PROBLEM — O26.849 UTERINE SIZE DATE DISCREPANCY PREGNANCY: Status: RESOLVED | Noted: 2022-05-18 | Resolved: 2022-06-29

## 2022-06-29 PROBLEM — O26.619 CHOLESTASIS DURING PREGNANCY: Status: RESOLVED | Noted: 2022-06-27 | Resolved: 2022-06-29

## 2022-06-29 PROBLEM — O26.649 CHOLESTASIS DURING PREGNANCY: Status: RESOLVED | Noted: 2022-06-27 | Resolved: 2022-06-29

## 2022-06-29 PROBLEM — K83.1 CHOLESTASIS DURING PREGNANCY: Status: RESOLVED | Noted: 2022-06-27 | Resolved: 2022-06-29

## 2022-06-29 LAB
BASOPHILS # BLD AUTO: 0.05 10*3/MM3 (ref 0–0.2)
BASOPHILS NFR BLD AUTO: 0.5 % (ref 0–1.5)
DEPRECATED RDW RBC AUTO: 40 FL (ref 37–54)
EOSINOPHIL # BLD AUTO: 0.11 10*3/MM3 (ref 0–0.4)
EOSINOPHIL NFR BLD AUTO: 1 % (ref 0.3–6.2)
ERYTHROCYTE [DISTWIDTH] IN BLOOD BY AUTOMATED COUNT: 12.8 % (ref 12.3–15.4)
HCT VFR BLD AUTO: 32.9 % (ref 34–46.6)
HGB BLD-MCNC: 10.8 G/DL (ref 12–15.9)
IMM GRANULOCYTES # BLD AUTO: 0.15 10*3/MM3 (ref 0–0.05)
IMM GRANULOCYTES NFR BLD AUTO: 1.4 % (ref 0–0.5)
LYMPHOCYTES # BLD AUTO: 2.04 10*3/MM3 (ref 0.7–3.1)
LYMPHOCYTES NFR BLD AUTO: 19 % (ref 19.6–45.3)
MCH RBC QN AUTO: 28.3 PG (ref 26.6–33)
MCHC RBC AUTO-ENTMCNC: 32.8 G/DL (ref 31.5–35.7)
MCV RBC AUTO: 86.1 FL (ref 79–97)
MONOCYTES # BLD AUTO: 0.85 10*3/MM3 (ref 0.1–0.9)
MONOCYTES NFR BLD AUTO: 7.9 % (ref 5–12)
NEUTROPHILS NFR BLD AUTO: 7.52 10*3/MM3 (ref 1.7–7)
NEUTROPHILS NFR BLD AUTO: 70.2 % (ref 42.7–76)
NRBC BLD AUTO-RTO: 0 /100 WBC (ref 0–0.2)
PLATELET # BLD AUTO: 247 10*3/MM3 (ref 140–450)
PMV BLD AUTO: 11 FL (ref 6–12)
RBC # BLD AUTO: 3.82 10*6/MM3 (ref 3.77–5.28)
WBC NRBC COR # BLD: 10.72 10*3/MM3 (ref 3.4–10.8)

## 2022-06-29 PROCEDURE — 85025 COMPLETE CBC W/AUTO DIFF WBC: CPT | Performed by: OBSTETRICS & GYNECOLOGY

## 2022-06-29 RX ORDER — IBUPROFEN 600 MG/1
600 TABLET ORAL EVERY 8 HOURS PRN
Qty: 30 TABLET | Refills: 0 | Status: SHIPPED | OUTPATIENT
Start: 2022-06-29 | End: 2022-08-18

## 2022-06-29 RX ADMIN — DOCUSATE SODIUM 100 MG: 100 CAPSULE, LIQUID FILLED ORAL at 09:51

## 2022-06-29 RX ADMIN — IBUPROFEN 600 MG: 600 TABLET ORAL at 06:11

## 2022-07-01 ENCOUNTER — TELEPHONE (OUTPATIENT)
Dept: OBSTETRICS AND GYNECOLOGY | Age: 32
End: 2022-07-01

## 2022-07-13 ENCOUNTER — POSTPARTUM VISIT (OUTPATIENT)
Dept: OBSTETRICS AND GYNECOLOGY | Age: 32
End: 2022-07-13

## 2022-07-13 VITALS
SYSTOLIC BLOOD PRESSURE: 110 MMHG | BODY MASS INDEX: 22.49 KG/M2 | HEIGHT: 65 IN | WEIGHT: 135 LBS | DIASTOLIC BLOOD PRESSURE: 60 MMHG

## 2022-07-13 PROCEDURE — 0503F POSTPARTUM CARE VISIT: CPT | Performed by: STUDENT IN AN ORGANIZED HEALTH CARE EDUCATION/TRAINING PROGRAM

## 2022-07-13 NOTE — PROGRESS NOTES
"[facility]   Obstetrics and Gynecology   Postpartum Visit    2022    Patient: Wayne Mejia          MR#:[id number]    History of Present Illness    Chief Complaint   Patient presents with   • Postpartum Care     PP 2 wks  2022 Female \"Petra\", Breast and bottle feeding, Perineal 1st lac repaired w/3-0 vicryl, No problems       31 y.o. female  status post  22 presents for postpartum visit without complaints.  Mood stable.  Doing well with lots of family support.  Janet is doing great too.  Gaining weight appropriately.  Breast and bottle-feeding without issue.  Bleeding minimal.  No intercourse since delivery.    Contraception: likely OCPs  Vaccines: RI, VI, s/p tdap  Pap: NIL   ________________________________________  Patient Active Problem List   Diagnosis   • 35 weeks gestation of pregnancy   • MTHFR mutation   • Carrier of spinal muscular atrophy   • CY gene mutation   • Cholestasis of pregnancy in third trimester   • Fetal pericardial effusion affecting management of mother   • Positive GBS test   •  (normal spontaneous vaginal delivery)       Past Medical History:   Diagnosis Date   • Carrier of spinal muscular atrophy     carrier positive, in media tab   • Lumbar herniated disc    • MTHFR gene mutation     carrier positive, in media tab       Past Surgical History:   Procedure Laterality Date   • WISDOM TOOTH EXTRACTION         Social History     Tobacco Use   Smoking Status Never Smoker   Smokeless Tobacco Never Used       has a current medication list which includes the following prescription(s): ibuprofen and prenatal (classic) vitamin.  ________________________________________         Review of Systems   All other systems reviewed and are negative.      Objective     /60   Ht 165.1 cm (65\")   Wt 61.2 kg (135 lb)   Breastfeeding Yes Comment: Breast and bottle  BMI 22.47 kg/m²    BP Readings from Last 3 Encounters:   22 110/60 " "  22 109/75   22 119/78      Wt Readings from Last 3 Encounters:   22 61.2 kg (135 lb)   22 67.4 kg (148 lb 9.6 oz)   22 66.7 kg (147 lb)      BMI: Estimated body mass index is 22.47 kg/m² as calculated from the following:    Height as of this encounter: 165.1 cm (65\").    Weight as of this encounter: 61.2 kg (135 lb).    EXAM     General:     Patient appears well in NAD  Respiratory: Normal effort, no distress  Psych:  Mood stable  Ext:  No cyanosis, edema 1-2    Assessment:    Diagnoses and all orders for this visit:    1.  (normal spontaneous vaginal delivery) (Primary)    2. Postpartum state    -doing really well  -discussed OCPs at 6wk    Plan:  Return in about 4 weeks (around 8/10/2022) for 6wk postpartum visit.      Tessa Purvis MD  2022 08:37 EDT  "

## 2022-08-18 ENCOUNTER — POSTPARTUM VISIT (OUTPATIENT)
Dept: OBSTETRICS AND GYNECOLOGY | Age: 32
End: 2022-08-18

## 2022-08-18 VITALS
HEIGHT: 65 IN | WEIGHT: 134.4 LBS | BODY MASS INDEX: 22.39 KG/M2 | DIASTOLIC BLOOD PRESSURE: 64 MMHG | SYSTOLIC BLOOD PRESSURE: 122 MMHG

## 2022-08-18 DIAGNOSIS — R79.89 ELEVATED LFTS: ICD-10-CM

## 2022-08-18 PROBLEM — O36.8990 FETAL PERICARDIAL EFFUSION AFFECTING MANAGEMENT OF MOTHER: Status: RESOLVED | Noted: 2022-06-23 | Resolved: 2022-08-18

## 2022-08-18 PROBLEM — K83.1 CHOLESTASIS OF PREGNANCY IN THIRD TRIMESTER: Status: RESOLVED | Noted: 2022-06-13 | Resolved: 2022-08-18

## 2022-08-18 PROBLEM — Z3A.35 35 WEEKS GESTATION OF PREGNANCY: Status: RESOLVED | Noted: 2021-12-22 | Resolved: 2022-08-18

## 2022-08-18 PROBLEM — O26.613 CHOLESTASIS OF PREGNANCY IN THIRD TRIMESTER: Status: RESOLVED | Noted: 2022-06-13 | Resolved: 2022-08-18

## 2022-08-18 PROBLEM — B95.1 POSITIVE GBS TEST: Status: RESOLVED | Noted: 2022-06-27 | Resolved: 2022-08-18

## 2022-08-18 PROBLEM — O26.643 CHOLESTASIS OF PREGNANCY IN THIRD TRIMESTER: Status: RESOLVED | Noted: 2022-06-13 | Resolved: 2022-08-18

## 2022-08-18 PROCEDURE — 0503F POSTPARTUM CARE VISIT: CPT | Performed by: STUDENT IN AN ORGANIZED HEALTH CARE EDUCATION/TRAINING PROGRAM

## 2022-08-18 RX ORDER — VALACYCLOVIR HYDROCHLORIDE 500 MG/1
TABLET, FILM COATED ORAL
COMMUNITY
Start: 2022-08-09

## 2022-08-18 RX ORDER — NORETHINDRONE ACETATE AND ETHINYL ESTRADIOL 1MG-20(21)
1 KIT ORAL DAILY
Qty: 84 TABLET | Refills: 3 | Status: SHIPPED | OUTPATIENT
Start: 2022-08-18 | End: 2023-08-18

## 2022-08-18 NOTE — PROGRESS NOTES
"[facility]   Obstetrics and Gynecology   Postpartum Visit    2022    Patient: Wayne Mejia          MR#:[id number]    History of Present Illness    Chief Complaint   Patient presents with   • Postpartum Care     PP 7 wks  2022 Female \"Petra\", Breast pumping, Perineal 1st lac repaired w/3-0 vicryl, No problems today       31 y.o. female  status post  22 presents for postpartum visit without complaints.  Mood stable.  Doing well with lots of family support.  Janet is doing great too.  Gaining weight appropriately.  Breast and bottle-feeding without issue.  Bleeding resolved.  No menses. Had intercourse without issue.    Delivery c/b cholestasis.     Contraception: OCPs  Vaccines: RI, VI, s/p tdap  Pap: NIL 21  ________________________________________  Patient Active Problem List   Diagnosis   • MTHFR mutation   • Carrier of spinal muscular atrophy   • CY gene mutation       Past Medical History:   Diagnosis Date   • Carrier of spinal muscular atrophy     carrier positive, in media tab   • Lumbar herniated disc    • MTHFR gene mutation     carrier positive, in media tab       Past Surgical History:   Procedure Laterality Date   • WISDOM TOOTH EXTRACTION         Social History     Tobacco Use   Smoking Status Never Smoker   Smokeless Tobacco Never Used       has a current medication list which includes the following prescription(s): valacyclovir and norethindrone-ethinyl estradiol fe.  ________________________________________         Review of Systems   All other systems reviewed and are negative.      Objective     /64   Ht 165.1 cm (65\")   Wt 61 kg (134 lb 6.4 oz)   Breastfeeding Yes Comment: Pumping  BMI 22.37 kg/m²    BP Readings from Last 3 Encounters:   22 122/64   22 110/60   22 109/75      Wt Readings from Last 3 Encounters:   22 61 kg (134 lb 6.4 oz)   22 61.2 kg (135 lb)   22 67.4 kg (148 lb 9.6 oz)      BMI: " "Estimated body mass index is 22.37 kg/m² as calculated from the following:    Height as of this encounter: 165.1 cm (65\").    Weight as of this encounter: 61 kg (134 lb 6.4 oz).    EXAM     General:     Patient appears well in NAD  Respiratory: Normal effort, no distress  Breast:  declined  Abdomen: Soft, NT, no acute findings  Pelvic:  Scant lochia, perineum without signs of infection and healing well, uterus small and nontender  Ext:  No cyanosis or edema    Assessment:    Diagnoses and all orders for this visit:    1. Postpartum state (Primary)    2. Elevated LFTs  -     Comprehensive Metabolic Panel    3.  (normal spontaneous vaginal delivery)    Other orders  -     norethindrone-ethinyl estradiol FE (Junel FE 1/20) 1-20 MG-MCG per tablet; Take 1 tablet by mouth Daily.  Dispense: 84 tablet; Refill: 3    -Doing well, no acute issues  - Desires OCPs for contraception, Rx sent  - Delivery complicated by cholestasis with elevated LFTs.  Recheck LFTs today.  Discussed increased risk in future pregnancies but no need to avoid pregnancy for this reason alone.    Plan:  Return in about 1 year (around 2023) for Annual.      Tessa Purvis MD  2022 11:50 EDT  "

## 2022-08-19 LAB
ALBUMIN SERPL-MCNC: 4.8 G/DL (ref 3.9–5)
ALBUMIN/GLOB SERPL: 2.3 {RATIO} (ref 1.2–2.2)
ALP SERPL-CCNC: 86 IU/L (ref 44–121)
ALT SERPL-CCNC: 30 IU/L (ref 0–32)
AST SERPL-CCNC: 26 IU/L (ref 0–40)
BILIRUB SERPL-MCNC: 0.5 MG/DL (ref 0–1.2)
BUN SERPL-MCNC: 13 MG/DL (ref 6–20)
BUN/CREAT SERPL: 14 (ref 9–23)
CALCIUM SERPL-MCNC: 9.5 MG/DL (ref 8.7–10.2)
CHLORIDE SERPL-SCNC: 101 MMOL/L (ref 96–106)
CO2 SERPL-SCNC: 25 MMOL/L (ref 20–29)
CREAT SERPL-MCNC: 0.9 MG/DL (ref 0.57–1)
EGFRCR-CYS SERPLBLD CKD-EPI 2021: 89 ML/MIN/1.73
GLOBULIN SER CALC-MCNC: 2.1 G/DL (ref 1.5–4.5)
GLUCOSE SERPL-MCNC: 79 MG/DL (ref 65–99)
POTASSIUM SERPL-SCNC: 4.7 MMOL/L (ref 3.5–5.2)
PROT SERPL-MCNC: 6.9 G/DL (ref 6–8.5)
SODIUM SERPL-SCNC: 141 MMOL/L (ref 134–144)

## 2023-05-22 ENCOUNTER — OFFICE VISIT (OUTPATIENT)
Dept: INTERNAL MEDICINE | Facility: CLINIC | Age: 33
End: 2023-05-22
Payer: COMMERCIAL

## 2023-05-22 VITALS
RESPIRATION RATE: 16 BRPM | TEMPERATURE: 97.8 F | BODY MASS INDEX: 23.53 KG/M2 | WEIGHT: 141.2 LBS | HEART RATE: 74 BPM | HEIGHT: 65 IN | OXYGEN SATURATION: 100 % | DIASTOLIC BLOOD PRESSURE: 70 MMHG | SYSTOLIC BLOOD PRESSURE: 116 MMHG

## 2023-05-22 DIAGNOSIS — Z82.49 FAMILY HISTORY OF HYPERTENSION: ICD-10-CM

## 2023-05-22 DIAGNOSIS — E55.9 VITAMIN D DEFICIENCY: Primary | ICD-10-CM

## 2023-05-22 DIAGNOSIS — Z15.89 MTHFR GENE MUTATION: ICD-10-CM

## 2023-05-22 PROBLEM — M47.816 LUMBAR SPONDYLOSIS: Status: ACTIVE | Noted: 2019-08-28

## 2023-05-22 NOTE — PROGRESS NOTES
"Chief Complaint   Patient presents with   • Establish Care       Subjective   Wayne Mejia is a 32 y.o. female here for   Chief Complaint   Patient presents with   • Establish Care   .    History of Present Illness     The following portions of the patient's history were reviewed and updated as appropriate: allergies, current medications, past family history, past medical history, past social history, past surgical history, and problem list.  Here for the physical today.    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     Review of Systems   All other systems reviewed and are negative.      Body mass index is 23.5 kg/m².   Vitals:    05/22/23 0914   BP: 116/70   Pulse: 74   Resp: 16   Temp: 97.8 °F (36.6 °C)   SpO2: 100%   Weight: 64 kg (141 lb 3.2 oz)   Height: 165.1 cm (65\")        Objective   Physical Exam  Vitals and nursing note reviewed.   Constitutional:       General: She is not in acute distress.     Appearance: Normal appearance.   HENT:      Head: Normocephalic and atraumatic.      Right Ear: Tympanic membrane and ear canal normal.      Left Ear: Tympanic membrane and ear canal normal.      Nose: Nose normal. No congestion.      Mouth/Throat:      Mouth: Mucous membranes are moist.      Pharynx: No oropharyngeal exudate or posterior oropharyngeal erythema.   Eyes:      Extraocular Movements: Extraocular movements intact.      Conjunctiva/sclera: Conjunctivae normal.      Pupils: Pupils are equal, round, and reactive to light.   Cardiovascular:      Rate and Rhythm: Normal rate and regular rhythm.      Pulses: Normal pulses.      Heart sounds: Normal heart sounds. No murmur heard.  Pulmonary:      Effort: Pulmonary effort is normal. No respiratory distress.    "   Breath sounds: Normal breath sounds. No wheezing or rales.   Abdominal:      General: Abdomen is flat. Bowel sounds are normal. There is no distension.      Palpations: Abdomen is soft.      Tenderness: There is no abdominal tenderness.   Musculoskeletal:      Cervical back: Normal range of motion and neck supple. No rigidity.   Lymphadenopathy:      Cervical: No cervical adenopathy.   Skin:     General: Skin is warm.      Capillary Refill: Capillary refill takes less than 2 seconds.   Neurological:      General: No focal deficit present.      Mental Status: She is alert and oriented to person, place, and time. Mental status is at baseline.      Cranial Nerves: No cranial nerve deficit.   Psychiatric:         Mood and Affect: Mood normal.         Behavior: Behavior normal.         Thought Content: Thought content normal.         No results found for: CBCDIF, CMP, LIPIDINTERP, HGBA1C, TSH, OIUC82PZ, PSA, TESTOSTERONE     Health Maintenance   Topic Date Due   • PAP SMEAR  08/22/2023 (Originally 12/9/2021)   • INFLUENZA VACCINE  08/01/2023   • ANNUAL PHYSICAL  05/22/2024   • TDAP/TD VACCINES (2 - Td or Tdap) 05/18/2032   • HEPATITIS C SCREENING  Completed   • COVID-19 Vaccine  Completed   • Pneumococcal Vaccine 0-64  Aged Out        Assessment & Plan   Problems Addressed this Visit    None  Visit Diagnoses     Vitamin D deficiency    -  Primary    Relevant Orders    Vitamin D 25 hydroxy    Family history of hypertension        Relevant Orders    Comprehensive metabolic panel    CBC w AUTO Differential    TSH    Hemoglobin A1c    Lipid panel    MTHFR gene mutation        Relevant Orders    Protime-INR    APTT      Diagnoses       Codes Comments    Vitamin D deficiency    -  Primary ICD-10-CM: E55.9  ICD-9-CM: 268.9     Family history of hypertension     ICD-10-CM: Z82.49  ICD-9-CM: V17.49     MTHFR gene mutation     ICD-10-CM: Z15.89  ICD-9-CM: V84.89         Wayne Mejia is a 32 y.o. female presenting to establish  care and for annual exam. No acute complaints, is carrier of MTHFR gene mutation, check coags. Other labs as above. F/u for annual exam or sooner if needed.     Return in about 1 year (around 5/22/2024) for Annual physical.     Joel Lyles MD  [facility]  [address]  Phone: [phone]

## 2023-05-23 LAB
25(OH)D3+25(OH)D2 SERPL-MCNC: 33.9 NG/ML (ref 30–100)
ALBUMIN SERPL-MCNC: 4.6 G/DL (ref 3.5–5.2)
ALBUMIN/GLOB SERPL: 1.8 G/DL
ALP SERPL-CCNC: 62 U/L (ref 39–117)
ALT SERPL-CCNC: 12 U/L (ref 1–33)
APTT PPP: 29.5 SECONDS (ref 22.7–35.4)
AST SERPL-CCNC: 16 U/L (ref 1–32)
BASOPHILS # BLD AUTO: 0.09 10*3/MM3 (ref 0–0.2)
BASOPHILS NFR BLD AUTO: 1 % (ref 0–1.5)
BILIRUB SERPL-MCNC: 0.3 MG/DL (ref 0–1.2)
BUN SERPL-MCNC: 10 MG/DL (ref 6–20)
BUN/CREAT SERPL: 11.9 (ref 7–25)
CALCIUM SERPL-MCNC: 9.1 MG/DL (ref 8.6–10.5)
CHLORIDE SERPL-SCNC: 104 MMOL/L (ref 98–107)
CHOLEST SERPL-MCNC: 217 MG/DL (ref 0–200)
CO2 SERPL-SCNC: 22.8 MMOL/L (ref 22–29)
CREAT SERPL-MCNC: 0.84 MG/DL (ref 0.57–1)
EGFRCR SERPLBLD CKD-EPI 2021: 96 ML/MIN/1.73
EOSINOPHIL # BLD AUTO: 0.05 10*3/MM3 (ref 0–0.4)
EOSINOPHIL NFR BLD AUTO: 0.5 % (ref 0.3–6.2)
ERYTHROCYTE [DISTWIDTH] IN BLOOD BY AUTOMATED COUNT: 13 % (ref 12.3–15.4)
GLOBULIN SER CALC-MCNC: 2.5 GM/DL
GLUCOSE SERPL-MCNC: 82 MG/DL (ref 65–99)
HBA1C MFR BLD: 5 % (ref 4.8–5.6)
HCT VFR BLD AUTO: 38.6 % (ref 34–46.6)
HDLC SERPL-MCNC: 88 MG/DL (ref 40–60)
HGB BLD-MCNC: 12.9 G/DL (ref 12–15.9)
IMM GRANULOCYTES # BLD AUTO: 0.04 10*3/MM3 (ref 0–0.05)
IMM GRANULOCYTES NFR BLD AUTO: 0.4 % (ref 0–0.5)
INR PPP: 0.99 (ref 0.9–1.1)
LDLC SERPL CALC-MCNC: 106 MG/DL (ref 0–100)
LYMPHOCYTES # BLD AUTO: 2.17 10*3/MM3 (ref 0.7–3.1)
LYMPHOCYTES NFR BLD AUTO: 23.2 % (ref 19.6–45.3)
MCH RBC QN AUTO: 29.2 PG (ref 26.6–33)
MCHC RBC AUTO-ENTMCNC: 33.4 G/DL (ref 31.5–35.7)
MCV RBC AUTO: 87.3 FL (ref 79–97)
MONOCYTES # BLD AUTO: 0.7 10*3/MM3 (ref 0.1–0.9)
MONOCYTES NFR BLD AUTO: 7.5 % (ref 5–12)
NEUTROPHILS # BLD AUTO: 6.31 10*3/MM3 (ref 1.7–7)
NEUTROPHILS NFR BLD AUTO: 67.4 % (ref 42.7–76)
NRBC BLD AUTO-RTO: 0 /100 WBC (ref 0–0.2)
PLATELET # BLD AUTO: 245 10*3/MM3 (ref 140–450)
POTASSIUM SERPL-SCNC: 4.4 MMOL/L (ref 3.5–5.2)
PROT SERPL-MCNC: 7.1 G/DL (ref 6–8.5)
PROTHROMBIN TIME: 13.2 SECONDS (ref 11.7–14.2)
RBC # BLD AUTO: 4.42 10*6/MM3 (ref 3.77–5.28)
SODIUM SERPL-SCNC: 139 MMOL/L (ref 136–145)
TRIGL SERPL-MCNC: 135 MG/DL (ref 0–150)
TSH SERPL DL<=0.005 MIU/L-ACNC: 1.55 UIU/ML (ref 0.27–4.2)
VLDLC SERPL CALC-MCNC: 23 MG/DL (ref 5–40)
WBC # BLD AUTO: 9.36 10*3/MM3 (ref 3.4–10.8)

## 2023-05-27 ENCOUNTER — PATIENT ROUNDING (BHMG ONLY) (OUTPATIENT)
Dept: INTERNAL MEDICINE | Facility: CLINIC | Age: 33
End: 2023-05-27
Payer: COMMERCIAL

## 2023-05-28 NOTE — PROGRESS NOTES
A My-Chart message has been sent to the patient for Patient Rounding with [facility].

## 2023-08-24 ENCOUNTER — OFFICE VISIT (OUTPATIENT)
Dept: OBSTETRICS AND GYNECOLOGY | Age: 33
End: 2023-08-24
Payer: COMMERCIAL

## 2023-08-24 VITALS
HEIGHT: 65 IN | WEIGHT: 138.8 LBS | BODY MASS INDEX: 23.13 KG/M2 | DIASTOLIC BLOOD PRESSURE: 60 MMHG | SYSTOLIC BLOOD PRESSURE: 102 MMHG

## 2023-08-24 DIAGNOSIS — Z30.41 ORAL CONTRACEPTIVE USE: ICD-10-CM

## 2023-08-24 DIAGNOSIS — Z01.419 WELL WOMAN EXAM WITH ROUTINE GYNECOLOGICAL EXAM: Primary | ICD-10-CM

## 2023-08-24 PROCEDURE — 99395 PREV VISIT EST AGE 18-39: CPT | Performed by: STUDENT IN AN ORGANIZED HEALTH CARE EDUCATION/TRAINING PROGRAM

## 2023-08-24 RX ORDER — NORETHINDRONE ACETATE AND ETHINYL ESTRADIOL 1MG-20(21)
1 KIT ORAL DAILY
Qty: 84 TABLET | Refills: 3 | Status: SHIPPED | OUTPATIENT
Start: 2023-08-24

## 2023-08-24 NOTE — PROGRESS NOTES
[facility]   Obstetrics and Gynecology   Routine Annual Visit    2023    Patient: Wayne Mejia          MR#:[id number]    History of Present Illness    Chief Complaint   Patient presents with    Annual Exam     AE today, Last pap  NIL per pt, OCP's, No problems today       32 y.o. female  who presents for annual exam.  Takes OCPs and likes them.  graduates next Spring so will likely try for pregnancy after that.  Regular monthly menses.    Studies reviewed:  Pap NIL 21    Obstetric History:  OB History          1    Para   1    Term   0       1    AB   0    Living   1         SAB   0    IAB   0    Ectopic   0    Molar   0    Multiple   0    Live Births   1               Menstrual History:     Patient's last menstrual period was 2023 (approximate).       Sexual History:   Sexually active with  only, declines STD screening      Social History:   Works for [employer]  1 daughterЕлена    ________________________________________  Patient Active Problem List   Diagnosis    MTHFR mutation    Carrier of spinal muscular atrophy    CY gene mutation    Lumbar spondylosis     Past Medical History:   Diagnosis Date    Carrier of spinal muscular atrophy     carrier positive, in media tab    Lumbar herniated disc     MTHFR gene mutation     carrier positive, in media tab     Past Surgical History:   Procedure Laterality Date    WISDOM TOOTH EXTRACTION       Social History     Tobacco Use   Smoking Status Never    Passive exposure: Never   Smokeless Tobacco Never     Family History   Problem Relation Age of Onset    Hypertension Father     Hyperlipidemia Mother     Breast cancer Neg Hx     Ovarian cancer Neg Hx     Uterine cancer Neg Hx     Colon cancer Neg Hx      Prior to Admission medications    Medication Sig Start Date End Date Taking? Authorizing Provider   Junel FE 1/20 1-20 MG-MCG per tablet TAKE 1 TABLET BY MOUTH EVERY DAY 23  Yes Tessa Purvis  "MD Shannon   valACYclovir (VALTREX) 500 MG tablet TAKE 2 TABLETS BY MOUTH ONCE DAILY FOR 15 DAYS 8/9/22  Yes Provider, MD Armando     ________________________________________    Current contraception: OCP (estrogen/progesterone)  History of abnormal Pap smear: no  Family history of uterine or ovarian cancer: no  Family History of colon cancer/colon polyps: no  History of abnormal mammogram: no    The following portions of the patient's history were reviewed and updated as appropriate: allergies, current medications, past family history, past medical history, past social history, past surgical history, and problem list.    Review of Systems   All other systems reviewed and are negative.         Objective     /60   Ht 165.1 cm (65\")   Wt 63 kg (138 lb 12.8 oz)   LMP 08/13/2023 (Approximate)   Breastfeeding No   BMI 23.10 kg/mý    BP Readings from Last 3 Encounters:   08/24/23 102/60   05/22/23 116/70   08/18/22 122/64      Wt Readings from Last 3 Encounters:   08/24/23 63 kg (138 lb 12.8 oz)   05/22/23 64 kg (141 lb 3.2 oz)   08/18/22 61 kg (134 lb 6.4 oz)        BMI: Estimated body mass index is 23.1 kg/mý as calculated from the following:    Height as of this encounter: 165.1 cm (65\").    Weight as of this encounter: 63 kg (138 lb 12.8 oz).    Physical Exam  Vitals and nursing note reviewed.   Constitutional:       General: She is not in acute distress.     Appearance: Normal appearance.   HENT:      Head: Normocephalic and atraumatic.   Eyes:      Extraocular Movements: Extraocular movements intact.   Cardiovascular:      Rate and Rhythm: Normal rate and regular rhythm.      Pulses: Normal pulses.      Heart sounds: No murmur heard.  Pulmonary:      Effort: Pulmonary effort is normal. No respiratory distress.      Breath sounds: Normal breath sounds.   Chest:   Breasts:     Right: Normal. No mass, nipple discharge, skin change or tenderness.      Left: Normal. No mass, nipple discharge, skin change " or tenderness.   Abdominal:      General: There is no distension.      Palpations: Abdomen is soft. There is no mass.      Tenderness: There is no abdominal tenderness.   Genitourinary:     General: Normal vulva.      Labia:         Right: No rash or lesion.         Left: No rash or lesion.       Urethra: No prolapse, urethral swelling or urethral lesion.      Vagina: Normal.      Cervix: Normal.      Uterus: Normal.       Adnexa: Right adnexa normal and left adnexa normal.      Comments: Bladder: no masses or tenderness  Perineum/Anus: no masses, lesions, or skin changes  Musculoskeletal:         General: No swelling. Normal range of motion.      Cervical back: Normal range of motion.   Lymphadenopathy:      Upper Body:      Right upper body: No axillary adenopathy.      Left upper body: No axillary adenopathy.   Skin:     General: Skin is warm and dry.   Neurological:      General: No focal deficit present.      Mental Status: She is alert and oriented to person, place, and time.   Psychiatric:         Mood and Affect: Mood normal.         Behavior: Behavior normal.       As part of wellness and prevention, the following topics were discussed with the patient:  Encouraged self breast exam  Physical activity and regular exercised encouraged.   Injury prevention discussed.  Healthy weight discussed.  Nutrition discussed.  Substance abuse/misuse discussed.  Sexual behavior/safe practices discussed.   Sexual transmitted disease prevention   Contraception discussed.   Mental health discussed.           Assessment:  Diagnoses and all orders for this visit:    1. Well woman exam with routine gynecological exam (Primary)    2. Oral contraceptive use    Other orders  -     norethindrone-ethinyl estradiol FE (Junel FE 1/20) 1-20 MG-MCG per tablet; Take 1 tablet by mouth Daily.  Dispense: 84 tablet; Refill: 3    -Breast and pelvic exam normal  - Pap up-to-date  - Declined STD screen  - OCPs refilled      Plan:  Return in  about 1 year (around 8/24/2024) for Annual.      Tessa Purvis MD  8/24/2023 08:38 EDT

## 2024-05-08 ENCOUNTER — OFFICE VISIT (OUTPATIENT)
Dept: OBSTETRICS AND GYNECOLOGY | Age: 34
End: 2024-05-08
Payer: COMMERCIAL

## 2024-05-08 VITALS
WEIGHT: 138.8 LBS | BODY MASS INDEX: 23.13 KG/M2 | DIASTOLIC BLOOD PRESSURE: 60 MMHG | HEIGHT: 65 IN | SYSTOLIC BLOOD PRESSURE: 102 MMHG

## 2024-05-08 DIAGNOSIS — N64.4 BREAST PAIN IN FEMALE: Primary | ICD-10-CM

## 2024-05-08 RX ORDER — FOLIC ACID 1 MG/1
1 TABLET ORAL DAILY
Qty: 90 TABLET | Refills: 3 | Status: SHIPPED | OUTPATIENT
Start: 2024-05-08